# Patient Record
Sex: FEMALE | Race: BLACK OR AFRICAN AMERICAN | Employment: FULL TIME | URBAN - METROPOLITAN AREA
[De-identification: names, ages, dates, MRNs, and addresses within clinical notes are randomized per-mention and may not be internally consistent; named-entity substitution may affect disease eponyms.]

---

## 2017-06-22 ENCOUNTER — OP HISTORICAL/CONVERTED ENCOUNTER (OUTPATIENT)
Dept: OTHER | Age: 57
End: 2017-06-22

## 2018-04-03 ENCOUNTER — OP HISTORICAL/CONVERTED ENCOUNTER (OUTPATIENT)
Dept: OTHER | Age: 58
End: 2018-04-03

## 2018-04-09 ENCOUNTER — OP HISTORICAL/CONVERTED ENCOUNTER (OUTPATIENT)
Dept: OTHER | Age: 58
End: 2018-04-09

## 2018-04-17 ENCOUNTER — OP HISTORICAL/CONVERTED ENCOUNTER (OUTPATIENT)
Dept: OTHER | Age: 58
End: 2018-04-17

## 2018-05-01 ENCOUNTER — OFFICE VISIT (OUTPATIENT)
Dept: ONCOLOGY | Age: 58
End: 2018-05-01

## 2018-05-01 VITALS
HEIGHT: 67 IN | RESPIRATION RATE: 18 BRPM | WEIGHT: 232 LBS | HEART RATE: 99 BPM | OXYGEN SATURATION: 97 % | DIASTOLIC BLOOD PRESSURE: 90 MMHG | TEMPERATURE: 98 F | BODY MASS INDEX: 36.41 KG/M2 | SYSTOLIC BLOOD PRESSURE: 161 MMHG

## 2018-05-01 DIAGNOSIS — R59.1 LYMPHADENOPATHY: ICD-10-CM

## 2018-05-01 DIAGNOSIS — Z85.3 HISTORY OF BREAST CANCER: Primary | ICD-10-CM

## 2018-05-01 NOTE — PROGRESS NOTES
[employer]  [facility], [address] [place] [address]  Lisa Pabonvtarangre[address]  W: [phone]  F: [phone]     f/u HEME/ONC CONSULT      Reason for visit: management of   Breast Cancer    HPI:   Adal Eaton is a 66 y.o.  female who I was asked to see for a history of breast cancer and new LAD. She was originally diagnosed on 12/15/10 with a left breast biopsy showing IDC, gr 3, 0.4 cm, ER negative, WV negative, HER 2 negative (IHC 2 +, negative by FISH per report) ki67 90% . Originally clinical stage 3. Underwent neoadjuvant chemotherapy by Dr. Shital Haider, unclear regimen, may have been DD [facility] and paclitaxel. S/p with Dr. Charo Estes bilateral mastectomies on 8/10/11 showed benign tissue on the right, and on the left, no residual disease in the breast, but 1/10 LN involved with 0.8 cm of tumor, triple negative. S/p XRT with Dr. Dulce Saavedra on 10/25/11. [id number], stage IIA. BRCA 1/2 negative 1/4/11, MACKENZIE not performed. She was in [facility] ED for chest pain and SOB in 2/2018. Referred to a cardiologist, who ordered the Chest CT below --    4/3/18 CT chest shows right axillary and supraclavicular enlarged LN:  Small bilateral hilar LN, largest in right hilum being 1.5 cm; enlarged right ax LN, 2 cm; 1.6 cm right supraclav LN    4/17/18 PET:  Low grade activity in multiple small LNs throughout each side of neck    No complaints today. DX   Encounter Diagnoses   Name Primary?     History of breast cancer Yes    Lymphadenopathy               Past Medical History:   Diagnosis Date    Breast cancer ([address] 8/9/2011    Cancer ([address] 12/2010    left breast; LEFT ARM RESTRICTION FOR STICKS & BPs    Carcinoma of female breast ([address] 12/29/2010    Other ill-defined conditions(799.89)     Hiatal Hernia    Other ill-defined conditions(799.89)     Migraines    Other ill-defined conditions(799.89)     Heart Murmur (past)    Psychiatric disorder     ANXIETY IF DRIVING ON INTERSTATE    Unspecified adverse effect of anesthesia 2000    Lost Hair after colonoscopy  -  eyelids felt like \"chemical burn\"     Past Surgical History:   Procedure Laterality Date    HX BREAST BIOPSY      x 4  (in office)    HX BREAST RECONSTRUCTION  07/21012    HX BREAST RECONSTRUCTION  7/24/2013    BREAST RECONSTRUCTION performed by Pat Eldridge MD at [address] HX BREAST RECONSTRUCTION  4/18/2014    REVISION RIGHT RECONSTRUCTION BREAST performed by Pat Eldridge MD at [address] HX MASTECTOMY  2011    BILATERAL MASTECTOMY    HX ORTHOPAEDIC      Left Rotator Cuff Repair x 2     HX ORTHOPAEDIC      Manipulation of left shoulder x 2    HX OTHER SURGICAL      Colonoscopy    HX PELVIC LAPAROSCOPY      HX VASCULAR ACCESS      Portacath insertion/REMOVAL     Social History     Social History    Marital status: SINGLE     Spouse name: N/A    Number of children: N/A    Years of education: N/A     Social History Main Topics    Smoking status: Former Smoker     Packs/day: 0.25     Years: 9.00     Quit date: 6/6/1989    Smokeless tobacco: Never Used      Comment: SOCIAL SMOKING ONLY    Alcohol use Yes      Comment: SOCIALLY    Drug use: No    Sexual activity: No     Other Topics Concern    None     Social History Narrative     Family History   Problem Relation Age of Onset    Hypertension Mother     Hypertension Maternal Aunt     Hypertension Maternal Uncle     Hypertension Maternal Grandmother     Cancer Paternal Grandmother      BREAST    Hypertension Maternal Aunt     Hypertension Maternal Aunt     Hypertension Maternal Aunt     Hypertension Maternal Aunt     Hypertension Maternal Uncle     Hypertension Maternal Aunt     Alcohol abuse Father     Hypertension Brother     Anesth Problems Neg Hx        Current Outpatient Prescriptions   Medication Sig Dispense Refill    cholecalciferol (VITAMIN D3) 1,000 unit tablet Take  by mouth daily.       cyanocobalamin (VITAMIN B12) 500 mcg tablet Take 500 mcg by mouth daily.  zinc 50 mg Tab Take 50 mg by mouth daily.  aspirin 81 mg tablet Take 81 mg by mouth daily.  FOLIC ACID PO Take 444 mg by mouth daily. Allergies   Allergen Reactions    Other Food Other (comments)     SLIM RODERICK CAUSES VOMITING    Mushroom Combination No.1 Anaphylaxis     Pt allergic to mushrooms.  Adhesive Tape-Silicones Other (comments)     Skin sloughing    Other Medication Diarrhea and Nausea and Vomiting     All \"Cillins\"    Pcn [Penicillins] Diarrhea and Nausea and Vomiting    Sulfamethoxazole-Trimethoprim Diarrhea and Itching       Review of Systems    A comprehensive review of systems was performed and all systems were negative except for HPI and for the symptom review form, reviewed and scanned in.          Objective:  Physical Exam:  Visit Vitals    /90    Pulse 99    Temp 98 °F (36.7 °C) (Temporal)    Resp 18    Ht 5' 7\" (1.702 m)    Wt 232 lb (105.2 kg)    SpO2 97%    BMI 36.34 kg/m2       General:  Alert, cooperative, no distress, appears stated age. Head:  Normocephalic, without obvious abnormality, atraumatic. Eyes:  Conjunctivae/corneas clear. PERRL, EOMs intact. Throat: Lips, mucosa, and tongue normal.    Neck: Supple, symmetrical, trachea midline, no adenopathy, thyroid: no enlargement/tenderness/nodules   Back:   Symmetric, no curvature. ROM normal. No CVA tenderness. Lungs:   Clear to auscultation bilaterally. Chest wall:  No tenderness or deformity. Heart:  Regular rate and rhythm, S1, S2 normal, no murmur, click, rub or gallop. Abdomen:   Soft, non-tender. Bowel sounds normal. No masses,  No organomegaly. Extremities: Extremities normal, atraumatic, no cyanosis or edema. Skin: Skin color, texture, turgor normal. No rashes or lesions. Lymph nodes: Cervical, supraclavicular, nodes normal.  Am able to palpate 2 cm right ax LN   Neurologic: CNII-XII intact.        Diagnostic Imaging Results for orders placed in visit on 06/26/13   CTA ABDOMEN PELV W CONT       Lab Results  Lab Results   Component Value Date/Time    WBC 6.3 04/11/2014 03:50 PM    HGB 13.7 04/11/2014 03:50 PM    HCT 39.3 04/11/2014 03:50 PM    PLATELET 234 46/96/9158 03:50 PM    MCV 73.2 (L) 04/11/2014 03:50 PM       Lab Results   Component Value Date/Time    Sodium 141 04/11/2014 03:50 PM    Potassium 3.8 04/11/2014 03:50 PM    Chloride 105 04/11/2014 03:50 PM    CO2 29 04/11/2014 03:50 PM    Anion gap 7 04/11/2014 03:50 PM    Glucose 71 04/11/2014 03:50 PM    BUN 13 04/11/2014 03:50 PM    Creatinine 0.34 (L) 04/11/2014 03:50 PM    BUN/Creatinine ratio 38 (H) 04/11/2014 03:50 PM    GFR est AA >60 04/11/2014 03:50 PM    GFR est non-AA >60 04/11/2014 03:50 PM    Calcium 9.4 04/11/2014 03:50 PM    AST (SGOT) 14 06/26/2013 10:08 AM    Alk. phosphatase 93 06/26/2013 10:08 AM    Protein, total 6.8 06/26/2013 10:08 AM    Albumin 4.0 06/26/2013 10:08 AM    A-G Ratio 1.4 06/26/2013 10:08 AM    ALT (SGPT) 16 06/26/2013 10:08 AM       4/2/18 wbc 5.2; hgb 15.7, plt 201    Assessment/Plan:  66 y.o. female with L triple negative breast cancer. PS 0    1. Left Breast cancer stage: IIA    Hormonal therapy: not indicated due to receptor (-) status     Goal of therapy was cure. See #2    2. New LAD:  Will get US of right axilla and possible biopsy    3. Emotional well being:  She is coping well with her disease and has excellent support. All of the patient's questions were answered today. > 40 min were spent with this patient with > 50% of that time spent in face to face counseling. There are no Patient Instructions on file for this visit. Follow-up Disposition:  Return in about 8 days (around 5/9/2018).     Eliseo Borjas MD

## 2018-05-07 ENCOUNTER — HOSPITAL ENCOUNTER (OUTPATIENT)
Dept: MAMMOGRAPHY | Age: 58
Discharge: HOME | End: 2018-05-07
Attending: INTERNAL MEDICINE
Payer: COMMERCIAL

## 2018-05-07 DIAGNOSIS — Z85.3 HISTORY OF BREAST CANCER: ICD-10-CM

## 2018-05-07 DIAGNOSIS — R59.1 LYMPHADENOPATHY: ICD-10-CM

## 2018-05-07 PROCEDURE — 88342 IMHCHEM/IMCYTCHM 1ST ANTB: CPT | Performed by: RADIOLOGY

## 2018-05-07 PROCEDURE — 88305 TISSUE EXAM BY PATHOLOGIST: CPT | Performed by: RADIOLOGY

## 2018-05-07 PROCEDURE — 76882 US LMTD JT/FCL EVL NVASC XTR: CPT

## 2018-05-07 PROCEDURE — 88341 IMHCHEM/IMCYTCHM EA ADD ANTB: CPT | Performed by: RADIOLOGY

## 2018-05-07 PROCEDURE — 74011000250 HC RX REV CODE- 250: Performed by: RADIOLOGY

## 2018-05-07 PROCEDURE — A4648 IMPLANTABLE TISSUE MARKER: HCPCS

## 2018-05-07 RX ORDER — LIDOCAINE HYDROCHLORIDE AND EPINEPHRINE 10; 10 MG/ML; UG/ML
10 INJECTION, SOLUTION INFILTRATION; PERINEURAL ONCE
Status: COMPLETED | OUTPATIENT
Start: 2018-05-07 | End: 2018-05-07

## 2018-05-07 RX ORDER — LIDOCAINE HYDROCHLORIDE 10 MG/ML
5 INJECTION INFILTRATION; PERINEURAL
Status: COMPLETED | OUTPATIENT
Start: 2018-05-07 | End: 2018-05-07

## 2018-05-07 RX ADMIN — LIDOCAINE HYDROCHLORIDE 5 ML: 10 INJECTION, SOLUTION INFILTRATION; PERINEURAL at 10:00

## 2018-05-07 RX ADMIN — LIDOCAINE HYDROCHLORIDE AND EPINEPHRINE 100 MG: 10; 10 INJECTION, SOLUTION INFILTRATION; PERINEURAL at 10:00

## 2018-05-07 NOTE — PROGRESS NOTES
5/7/2018      RE: Roney Ragsdale      To Whom it May Concern: This is to certify that Roney Ragsdale may may return to work with the following restrictions:    ACTIVITY     Do not participate in any strenuous activities for 24 - 48 hours (exercise, sports, housework, etc.).  You may resume work (light duty only) for the first 48 hours.  No heavy lifting with the arm on the affected side of your body for 24-48. Please feel free to contact my office if you have any questions or concerns. Thank you for your assistance in this matter.     Sincerely,      Leticia Lloyd RN

## 2018-05-07 NOTE — PROGRESS NOTES
Patient tolerated right axilla biopsy well with minimal bleeding. Biopsy site was dressed with one steri strip, a gauze and wide paper tape. Discharge instructions were reviewed with the patient and she was provided written copy as well as a note outlining activity restrictions to be given to her employer. Advised patient that pathology results should be available by Wednesday May 9 in the afternoon and that she would receive a phone call.

## 2018-05-07 NOTE — DISCHARGE INSTRUCTIONS
Breast Biopsy Discharge Instructions    PAIN CONTROL     You may have mild discomfort; take 1-2 Tylenol every 4-6 hours as needed.  Do not take aspirin containing products or anti-inflammatory medications (Advil, Aleve, Motrin, Ibuprofen, etc.) for 24 hours.  Wearing a comfortable bra for support may help with discomfort. WOUND CARE      A small amount of bleeding or bruising at the biopsy site is normal. Watch for signs and symptoms of infections: skin warm to touch, yellowish drainage from wound, fever or severe pain.  Place an ice pack over the site hourly, 20-30 at a time for a few hours today.  You may remove the band-aid in 24 hours. The steri-strip (small piece of tape covering the incision) will fall off on its own in a few days. Please try not to get the site wet for about 24 hours.  If at any point you have EXCESSIVE BLEEDING (saturating the gauze under the band-aid) OR pain please call:    Daytime 7:30am-5:00pm: [facility] [phone]    After Hours:    [phone] (ask to speak to a radiologist)              or [address] [place] [address] [phone]    ACTIVITY     Do not participate in any strenuous activities for 24 - 48 hours (exercise, sports, housework, etc.).  You may resume work (light duty only) for the first 48 hours.  No heavy lifting with the arm on the affected side of your body for 24-48. ADDITIONAL INSTRUCTIONS    We will call you with results on Wednesday May 9, in the afternoon.       YOUR TREATMENT TEAM TODAY WAS    MD: Nick Amador  RN: Victoriano Carrero  Radiology Tech: [place]

## 2018-05-09 ENCOUNTER — OFFICE VISIT (OUTPATIENT)
Dept: ONCOLOGY | Age: 58
End: 2018-05-09

## 2018-05-09 VITALS
TEMPERATURE: 97.9 F | BODY MASS INDEX: 36.1 KG/M2 | RESPIRATION RATE: 18 BRPM | HEART RATE: 90 BPM | WEIGHT: 230 LBS | SYSTOLIC BLOOD PRESSURE: 134 MMHG | DIASTOLIC BLOOD PRESSURE: 81 MMHG | HEIGHT: 67 IN

## 2018-05-09 DIAGNOSIS — R59.1 LYMPHADENOPATHY: ICD-10-CM

## 2018-05-09 DIAGNOSIS — C50.919 BREAST CANCER, STAGE 2, UNSPECIFIED LATERALITY (HCC): ICD-10-CM

## 2018-05-09 DIAGNOSIS — C50.919 MALIGNANT NEOPLASM OF BREAST, STAGE 2, UNSPECIFIED LATERALITY (HCC): Primary | ICD-10-CM

## 2018-05-09 NOTE — PROGRESS NOTES
[address]  [address] [place] [address], [address] [place] [address]  Lisa Pabonvremy [address]  W: [phone]  F: [phone]     f/u HEME/ONC CONSULT      Reason for visit: management of   Breast Cancer    HPI:   Aida Graham is a 66 y.o.  female who I was asked to see for a history of breast cancer and new LAD. She was originally diagnosed on 12/15/10 with a left breast biopsy showing IDC, gr 3, 0.4 cm, ER negative, AK negative, HER 2 negative (IHC 2 +, negative by FISH per report) ki67 90% . Originally clinical stage 3. Underwent neoadjuvant chemotherapy by Dr. Popeye Feng, unclear regimen, may have been DD [facility] and paclitaxel. S/p with Dr. Hans Dobson bilateral mastectomies on 8/10/11 showed benign tissue on the right, and on the left, no residual disease in the breast, but 1/10 LN involved with 0.8 cm of tumor, triple negative. S/p XRT with Dr. Lonny Love on 10/25/11. [id number], stage IIA. BRCA 1/2 negative 1/4/11, MACKENZIE not performed. She was in [facility] ED for chest pain and SOB in 2/2018. Referred to a cardiologist, who ordered the Chest CT below --    4/3/18 CT chest shows right axillary and supraclavicular enlarged LN:  Small bilateral hilar LN, largest in right hilum being 1.5 cm; enlarged right ax LN, 2 cm; 1.6 cm right supraclav LN    4/17/18 PET:  Low grade activity in multiple small LNs throughout each side of neck    Interval history: In today for follow up. Complains of gr 1 cough. DX   Encounter Diagnoses   Name Primary?     Malignant neoplasm of breast, stage 2, unspecified laterality ([address] Yes    Breast cancer, stage 2, unspecified laterality ([address]     Lymphadenopathy               Past Medical History:   Diagnosis Date    Breast cancer ([address] 8/9/2011    Cancer ([address] 12/2010    left breast; LEFT ARM RESTRICTION FOR STICKS & BPs    Carcinoma of female breast ([address] 12/29/2010    Other ill-defined conditions(799.89)     Hiatal Hernia    Other ill-defined conditions(799.89) Migraines    Other ill-defined conditions(799.89)     Heart Murmur (past)    Psychiatric disorder     ANXIETY IF DRIVING ON INTERSTATE    Unspecified adverse effect of anesthesia 2000    Lost Hair after colonoscopy  -  eyelids felt like \"chemical burn\"     Past Surgical History:   Procedure Laterality Date    HX BREAST BIOPSY      x 4  (in office)    HX BREAST RECONSTRUCTION  07/21012    HX BREAST RECONSTRUCTION  7/24/2013    BREAST RECONSTRUCTION performed by Max Bolivar MD at [address] HX BREAST RECONSTRUCTION  4/18/2014    REVISION RIGHT RECONSTRUCTION BREAST performed by Max Bolivar MD at [address] HX MASTECTOMY  2011    BILATERAL MASTECTOMY    HX ORTHOPAEDIC      Left Rotator Cuff Repair x 2     HX ORTHOPAEDIC      Manipulation of left shoulder x 2    HX OTHER SURGICAL      Colonoscopy    HX PELVIC LAPAROSCOPY      HX VASCULAR ACCESS      Portacath insertion/REMOVAL     Social History     Social History    Marital status: SINGLE     Spouse name: N/A    Number of children: N/A    Years of education: N/A     Social History Main Topics    Smoking status: Former Smoker     Packs/day: 0.25     Years: 9.00     Quit date: 6/6/1989    Smokeless tobacco: Never Used      Comment: SOCIAL SMOKING ONLY    Alcohol use Yes      Comment: SOCIALLY    Drug use: No    Sexual activity: No     Other Topics Concern    None     Social History Narrative     Family History   Problem Relation Age of Onset    Hypertension Mother     Alcohol abuse Father     Hypertension Maternal Aunt     Hypertension Maternal Uncle     Hypertension Maternal Grandmother     Cancer Paternal Grandmother      BREAST    Breast Cancer Paternal Grandmother     Hypertension Maternal Aunt     Hypertension Maternal Aunt     Hypertension Maternal Aunt     Hypertension Maternal Aunt     Hypertension Maternal Uncle     Hypertension Maternal Aunt     Hypertension Brother     Anesth Problems Neg Hx Current Outpatient Prescriptions   Medication Sig Dispense Refill    cholecalciferol (VITAMIN D3) 1,000 unit tablet Take  by mouth daily.  cyanocobalamin (VITAMIN B12) 500 mcg tablet Take 500 mcg by mouth daily.  zinc 50 mg Tab Take 50 mg by mouth daily.  aspirin 81 mg tablet Take 81 mg by mouth daily.  FOLIC ACID PO Take 738 mg by mouth daily. Allergies   Allergen Reactions    Other Food Other (comments)     SLIM RODERICK CAUSES VOMITING    Mushroom Combination No.1 Anaphylaxis     Pt allergic to mushrooms.  Adhesive Tape-Silicones Other (comments)     Skin sloughing    Other Medication Diarrhea and Nausea and Vomiting     All \"Cillins\"    Pcn [Penicillins] Diarrhea and Nausea and Vomiting    Sulfamethoxazole-Trimethoprim Diarrhea and Itching       Review of Systems    A comprehensive review of systems was performed and all systems were negative except for HPI and for the symptom review form, reviewed and scanned in.          Objective:  Physical Exam:  Visit Vitals    /81    Pulse 90    Temp 97.9 °F (36.6 °C) (Temporal)    Resp 18    Ht 5' 7\" (1.702 m)    Wt 230 lb (104.3 kg)    BMI 36.02 kg/m2       General:  Alert, cooperative, no distress, appears stated age. Head:  Normocephalic, without obvious abnormality, atraumatic. Eyes:  Conjunctivae/corneas clear. PERRL, EOMs intact. Throat: Lips, mucosa, and tongue normal.    Neck: Supple, symmetrical, trachea midline, no adenopathy, thyroid: no enlargement/tenderness/nodules   Back:   Symmetric, no curvature. ROM normal. No CVA tenderness. Lungs:   Clear to auscultation bilaterally. Chest wall:  No tenderness or deformity. Heart:  Regular rate and rhythm, S1, S2 normal, no murmur, click, rub or gallop. Abdomen:   Soft, non-tender. Bowel sounds normal. No masses,  No organomegaly. Extremities: Extremities normal, atraumatic, no cyanosis or edema.    Skin: Skin color, texture, turgor normal. No rashes or lesions. Lymph nodes: Cervical, supraclavicular, nodes normal.  Am able to palpate 2 cm right ax LN   Neurologic: CNII-XII intact. Diagnostic Imaging   4/3/18 CT chest shows right axillary and supraclavicular enlarged LN:  Small bilateral hilar LN, largest in right hilum being 1.5 cm; enlarged right ax LN, 2 cm; 1.6 cm right supraclav LN    4/17/18 PET:  Low grade activity in multiple small LNs throughout each side of neck    5/7/18 US Right axilla  IMPRESSION:  1. BI-RADS Assessment Category 4a: Suspicious abnormality - Biopsy should be  performed in the absence of clinical contraindication.     Right axillary lymph nodes are confirmed, the largest measuring 1.9 cm. After  discussion with Dr. Olvin Pope, it was decided to proceed with a right axillary lymph  node biopsy. This will be performed later today and reported separately.     The patient has been notified of these results and recommendations. 5/7/18 Right LN biopsy: negative. Results for orders placed in visit on 06/26/13   CTA ABDOMEN PELV W CONT       Lab Results  Lab Results   Component Value Date/Time    WBC 6.3 04/11/2014 03:50 PM    HGB 13.7 04/11/2014 03:50 PM    HCT 39.3 04/11/2014 03:50 PM    PLATELET 212 29/77/9451 03:50 PM    MCV 73.2 (L) 04/11/2014 03:50 PM       Lab Results   Component Value Date/Time    Sodium 141 04/11/2014 03:50 PM    Potassium 3.8 04/11/2014 03:50 PM    Chloride 105 04/11/2014 03:50 PM    CO2 29 04/11/2014 03:50 PM    Anion gap 7 04/11/2014 03:50 PM    Glucose 71 04/11/2014 03:50 PM    BUN 13 04/11/2014 03:50 PM    Creatinine 0.34 (L) 04/11/2014 03:50 PM    BUN/Creatinine ratio 38 (H) 04/11/2014 03:50 PM    GFR est AA >60 04/11/2014 03:50 PM    GFR est non-AA >60 04/11/2014 03:50 PM    Calcium 9.4 04/11/2014 03:50 PM    AST (SGOT) 14 06/26/2013 10:08 AM    Alk.  phosphatase 93 06/26/2013 10:08 AM    Protein, total 6.8 06/26/2013 10:08 AM    Albumin 4.0 06/26/2013 10:08 AM    A-G Ratio 1.4 06/26/2013 10:08 AM    ALT (SGPT) 16 06/26/2013 10:08 AM       4/2/18 wbc 5.2; hgb 15.7, plt 201    Assessment/Plan:  66 y.o. female with L triple negative breast cancer. PS 0    1. Left Breast cancer stage: IIA    Hormonal therapy: not indicated due to receptor (-) status     Goal of therapy was cure. See #2    2. New LAD:  US of right axilla and biospy on 5/7/18 normal.    3. Emotional well being:  She is coping well with her disease and has excellent support. All of the patient's questions were answered today. > 15 min were spent with this patient with > 50% of that time spent in face to face counseling. There are no Patient Instructions on file for this visit. Follow-up Disposition:  Return in about 1 year (around 5/9/2019) for 1 year follow shirlene Sanabria MD

## 2018-05-09 NOTE — PROGRESS NOTES
Pathology approved by Dr. Sujata Mejia. Called patient and advised her that the lymph node biopsy was benign. Patient states that she is on her way to Dr. Betty Rodríguez office now. She states that she has not removed the bandage, but that she has had no concerns with the biopsy site. Advised the patient that she may take the bandage off at this point. Encouraged her to call with any questions or concerns.

## 2019-06-19 ENCOUNTER — OFFICE VISIT (OUTPATIENT)
Dept: ONCOLOGY | Age: 59
End: 2019-06-19

## 2019-06-19 VITALS
DIASTOLIC BLOOD PRESSURE: 79 MMHG | WEIGHT: 233 LBS | HEIGHT: 67 IN | BODY MASS INDEX: 36.57 KG/M2 | SYSTOLIC BLOOD PRESSURE: 141 MMHG | RESPIRATION RATE: 18 BRPM | OXYGEN SATURATION: 98 % | HEART RATE: 82 BPM | TEMPERATURE: 97 F

## 2019-06-19 DIAGNOSIS — C50.919 MALIGNANT NEOPLASM OF BREAST, STAGE 2, UNSPECIFIED LATERALITY (HCC): Primary | ICD-10-CM

## 2019-06-19 DIAGNOSIS — R59.1 LYMPHADENOPATHY: ICD-10-CM

## 2019-06-19 RX ORDER — LOSARTAN POTASSIUM 25 MG/1
50 TABLET ORAL
Refills: 11 | COMMUNITY
Start: 2019-05-23

## 2019-06-19 NOTE — PROGRESS NOTES
[employer]  [address] [facility] [address]  W: [phone]  F: [phone]     f/u HEME/ONC CONSULT      Reason for visit: management of   Breast Cancer    HPI:   Aida Daley is a 64 y.o.  female who I was asked to see for a history of breast cancer and new LAD. She was originally diagnosed on 12/15/10 with a left breast biopsy showing IDC, gr 3, 0.4 cm, ER negative, IN negative, HER 2 negative (IHC 2 +, negative by FISH per report) ki67 90% . Originally clinical stage 3. Underwent neoadjuvant chemotherapy by Dr. Sherita Scheuermann, unclear regimen, may have been DD [facility] and paclitaxel. S/p with Dr. Salud Holley bilateral mastectomies on 8/10/11 showed benign tissue on the right, and on the left, no residual disease in the breast, but 1/10 LN involved with 0.8 cm of tumor, triple negative. S/p XRT with Dr. Bill Ontiveros on 10/25/11. [id number], stage IIA. BRCA 1/2 negative 1/4/11, MACKENZIE not performed. She was in [facility] ED for chest pain and SOB in 2/2018. Referred to a cardiologist, who ordered the Chest CT below --    4/3/18 CT chest shows right axillary and supraclavicular enlarged LN:  Small bilateral hilar LN, largest in right hilum being 1.5 cm; enlarged right ax LN, 2 cm; 1.6 cm right supraclav LN    4/17/18 PET:  Low grade activity in multiple small LNs throughout each side of neck    Interval history: In today for follow up. No complaints today. Recently had surgery to her left foot. DX   Encounter Diagnoses   Name Primary?     Malignant neoplasm of breast, stage 2, unspecified laterality ([address] Yes    Lymphadenopathy               Past Medical History:   Diagnosis Date    Breast cancer ([address] 8/9/2011    Cancer ([address] 12/2010    left breast; LEFT ARM RESTRICTION FOR STICKS & BPs    Carcinoma of female breast ([address] 12/29/2010    Other ill-defined conditions(799.89)     Hiatal Hernia    Other ill-defined conditions(799.89)     Migraines    Other ill-defined conditions(799.89)     Heart Murmur (past)    Psychiatric disorder     ANXIETY IF DRIVING ON INTERSTATE    Unspecified adverse effect of anesthesia     Lost Hair after colonoscopy  -  eyelids felt like \"chemical burn\"     Past Surgical History:   Procedure Laterality Date    HX BREAST BIOPSY      x 4  (in office)    HX BREAST RECONSTRUCTION      HX BREAST RECONSTRUCTION  2013    BREAST RECONSTRUCTION performed by Blanca Hirsch MD at [address] HX BREAST RECONSTRUCTION  2014    REVISION RIGHT RECONSTRUCTION BREAST performed by Blanca Hirsch MD at [address] HX MASTECTOMY  2011    BILATERAL MASTECTOMY    HX ORTHOPAEDIC      Left Rotator Cuff Repair x 2     HX ORTHOPAEDIC      Manipulation of left shoulder x 2    HX OTHER SURGICAL      Colonoscopy    HX PELVIC LAPAROSCOPY      HX VASCULAR ACCESS      Portacath insertion/REMOVAL     Social History     Socioeconomic History    Marital status: SINGLE     Spouse name: Not on file    Number of children: Not on file    Years of education: Not on file    Highest education level: Not on file   Tobacco Use    Smoking status: Former Smoker     Packs/day: 0.25     Years: 9.00     Pack years: 2.25     Last attempt to quit: 1989     Years since quittin.0    Smokeless tobacco: Never Used    Tobacco comment: SOCIAL SMOKING ONLY   Substance and Sexual Activity    Alcohol use: Yes     Comment: SOCIALLY    Drug use: No    Sexual activity: Never     Family History   Problem Relation Age of Onset    Hypertension Mother     Alcohol abuse Father     Hypertension Maternal Aunt     Hypertension Maternal Uncle     Hypertension Maternal Grandmother     Cancer Paternal Grandmother         BREAST    Breast Cancer Paternal Grandmother     Hypertension Maternal Aunt     Hypertension Maternal Aunt     Hypertension Maternal Aunt     Hypertension Maternal Aunt     Hypertension Maternal Uncle     Hypertension Maternal Aunt     Hypertension Brother     Anesth Problems Neg Hx        Current Outpatient Medications   Medication Sig Dispense Refill    losartan (COZAAR) 25 mg tablet TAKE 1 TABLET BY MOUTH EVERY DAY  11    cholecalciferol (VITAMIN D3) 1,000 unit tablet Take  by mouth daily.  cyanocobalamin (VITAMIN B12) 500 mcg tablet Take 500 mcg by mouth daily.  zinc 50 mg Tab Take 50 mg by mouth daily.  aspirin 81 mg tablet Take 81 mg by mouth daily.  FOLIC ACID PO Take 309 mg by mouth daily. Allergies   Allergen Reactions    Other Food Other (comments)     SLIM RODERICK CAUSES VOMITING    Mushroom Combination No.1 Anaphylaxis     Pt allergic to mushrooms.  Adhesive Tape-Silicones Other (comments)     Skin sloughing    Lidocaine Pain Relief [Lidocaine] Other (comments)    Other Medication Diarrhea and Nausea and Vomiting     All \"Cillins\"    Pcn [Penicillins] Diarrhea and Nausea and Vomiting    Sulfamethoxazole-Trimethoprim Diarrhea and Itching       Review of Systems    A comprehensive review of systems was performed and all systems were negative except for HPI and for the symptom review form, reviewed and scanned in.          Objective:  Physical Exam:  Visit Vitals  /79   Pulse 82   Temp 97 °F (36.1 °C) (Temporal)   Resp 18   Ht 5' 7\" (1.702 m)   Wt 233 lb (105.7 kg)   SpO2 98%   BMI 36.49 kg/m²       General:  Alert, cooperative, no distress, appears stated age. Head:  Normocephalic, without obvious abnormality, atraumatic. Eyes:  Conjunctivae/corneas clear. PERRL, EOMs intact. Throat: Lips, mucosa, and tongue normal.    Neck: Supple, symmetrical, trachea midline, no adenopathy, thyroid: no enlargement/tenderness/nodules   Back:   Symmetric, no curvature. ROM normal. No CVA tenderness. Lungs:   Clear to auscultation bilaterally. Chest wall:  No tenderness or deformity. Heart:  Regular rate and rhythm, S1, S2 normal, no murmur, click, rub or gallop. Abdomen:   Soft, non-tender. Bowel sounds normal. No masses,  No organomegaly. Extremities: L foot in boot   Skin: Skin color, texture, turgor normal. No rashes or lesions. Lymph nodes: Cervical, supraclavicular, nodes normal.  Unable to palpate right ax LN   Neurologic: CNII-XII intact. Diagnostic Imaging   4/3/18 CT chest shows right axillary and supraclavicular enlarged LN:  Small bilateral hilar LN, largest in right hilum being 1.5 cm; enlarged right ax LN, 2 cm; 1.6 cm right supraclav LN    4/17/18 PET:  Low grade activity in multiple small LNs throughout each side of neck    5/7/18 US Right axilla  IMPRESSION:  1. BI-RADS Assessment Category 4a: Suspicious abnormality - Biopsy should be  performed in the absence of clinical contraindication.     Right axillary lymph nodes are confirmed, the largest measuring 1.9 cm. After  discussion with Dr. Maddie Amaya, it was decided to proceed with a right axillary lymph  node biopsy. This will be performed later today and reported separately.     The patient has been notified of these results and recommendations. 5/7/18 Right LN biopsy: negative.      Results for orders placed in visit on 06/26/13   CTA ABDOMEN PELV W CONT       Lab Results  Lab Results   Component Value Date/Time    WBC 6.3 04/11/2014 03:50 PM    HGB 13.7 04/11/2014 03:50 PM    HCT 39.3 04/11/2014 03:50 PM    PLATELET 936 91/87/4661 03:50 PM    MCV 73.2 (L) 04/11/2014 03:50 PM       Lab Results   Component Value Date/Time    Sodium 141 04/11/2014 03:50 PM    Potassium 3.8 04/11/2014 03:50 PM    Chloride 105 04/11/2014 03:50 PM    CO2 29 04/11/2014 03:50 PM    Anion gap 7 04/11/2014 03:50 PM    Glucose 71 04/11/2014 03:50 PM    BUN 13 04/11/2014 03:50 PM    Creatinine 0.34 (L) 04/11/2014 03:50 PM    BUN/Creatinine ratio 38 (H) 04/11/2014 03:50 PM    GFR est AA >60 04/11/2014 03:50 PM    GFR est non-AA >60 04/11/2014 03:50 PM    Calcium 9.4 04/11/2014 03:50 PM    AST (SGOT) 14 06/26/2013 10:08 AM Alk. phosphatase 93 06/26/2013 10:08 AM    Protein, total 6.8 06/26/2013 10:08 AM    Albumin 4.0 06/26/2013 10:08 AM    A-G Ratio 1.4 06/26/2013 10:08 AM    ALT (SGPT) 16 06/26/2013 10:08 AM       4/2/18 wbc 5.2; hgb 15.7, plt 201    Assessment/Plan:  64 y.o. female with L triple negative breast cancer. PS 0    1. Left Breast cancer stage: IIA    Hormonal therapy: not indicated due to receptor (-) status     Goal of therapy was cure. See #2    2. New LAD:  US of right axilla and biospy on 5/7/18 normal.    3. Emotional well being:  She is coping well with her disease and has excellent support. 4.  Htn:  Just started losartan and following with pcp    All of the patient's questions were answered today. > 15 min were spent with this patient with > 50% of that time spent in face to face counseling. This patient was seen in conjunction with Kailyn Jaffe NP            There are no Patient Instructions on file for this visit. Follow-up and Dispositions    · Return in about 1 year (around 6/19/2020) for 1 yr fu, Maudie Gosselin.          Roxy Mishra MD

## 2020-06-16 ENCOUNTER — TELEPHONE (OUTPATIENT)
Dept: ONCOLOGY | Age: 60
End: 2020-06-16

## 2020-06-22 ENCOUNTER — VIRTUAL VISIT (OUTPATIENT)
Dept: ONCOLOGY | Age: 60
End: 2020-06-22

## 2020-06-22 DIAGNOSIS — Z85.3 HISTORY OF BREAST CANCER: Primary | ICD-10-CM

## 2020-06-22 NOTE — PROGRESS NOTES
[address]  [address] [facility], [address] [place] [address]  [place]  W: [phone]  F: [phone]     f/u HEME/ONC CONSULT        Reason for Visit:   Willy Ball is a 64 y.o. female who is seen by synchronous (real-time) audio-video technology for follow up of breast cancer    HPI:   Willy Ball is a 64 y.o.  female who I was asked to see for a history of breast cancer and new LAD. She was originally diagnosed on 12/15/10 with a left breast biopsy showing IDC, gr 3, 0.4 cm, ER negative, TX negative, HER 2 negative (IHC 2 +, negative by FISH per report) ki67 90% . Originally clinical stage 3. Underwent neoadjuvant chemotherapy by Dr. Marcelo Allison, unclear regimen, may have been DD [facility] and paclitaxel. S/p with Dr. Kaylee Kayser bilateral mastectomies on 8/10/11 showed benign tissue on the right, and on the left, no residual disease in the breast, but 1/10 LN involved with 0.8 cm of tumor, triple negative. S/p XRT with Dr. Ken Morillo on 10/25/11. [id number], stage IIA. BRCA 1/2 negative 1/4/11, MACKENZIE not performed. She was in [facility] ED for chest pain and SOB in 2/2018. Referred to a cardiologist, who ordered the Chest CT below --    4/3/18 CT chest shows right axillary and supraclavicular enlarged LN:  Small bilateral hilar LN, largest in right hilum being 1.5 cm; enlarged right ax LN, 2 cm; 1.6 cm right supraclav LN    4/17/18 PET:  Low grade activity in multiple small LNs throughout each side of neck    Interval history: In today for follow up. Thinks she had covid19 in Dec 2019        DX   Encounter Diagnosis   Name Primary?     History of breast cancer Yes              Past Medical History:   Diagnosis Date    Breast cancer ([address] 8/9/2011    Cancer ([address] 12/2010    left breast; LEFT ARM RESTRICTION FOR STICKS & BPs    Carcinoma of female breast ([address] 12/29/2010    Other ill-defined conditions(799.89)     Hiatal Hernia    Other ill-defined conditions(799.89)     Migraines  Other ill-defined conditions(799.89)     Heart Murmur (past)    Psychiatric disorder     ANXIETY IF DRIVING ON INTERSTATE    Unspecified adverse effect of anesthesia     Lost Hair after colonoscopy  -  eyelids felt like \"chemical burn\"     Past Surgical History:   Procedure Laterality Date    HX BREAST BIOPSY      x 4  (in office)    HX BREAST RECONSTRUCTION      HX BREAST RECONSTRUCTION  2013    BREAST RECONSTRUCTION performed by Josr Morales MD at [address] HX BREAST RECONSTRUCTION  2014    REVISION RIGHT RECONSTRUCTION BREAST performed by Josr Morales MD at [address] HX MASTECTOMY  2011    BILATERAL MASTECTOMY    HX ORTHOPAEDIC      Left Rotator Cuff Repair x 2     HX ORTHOPAEDIC      Manipulation of left shoulder x 2    HX OTHER SURGICAL      Colonoscopy    HX PELVIC LAPAROSCOPY      HX VASCULAR ACCESS      Portacath insertion/REMOVAL     Social History     Socioeconomic History    Marital status: SINGLE     Spouse name: Not on file    Number of children: Not on file    Years of education: Not on file    Highest education level: Not on file   Tobacco Use    Smoking status: Former Smoker     Packs/day: 0.25     Years: 9.00     Pack years: 2.25     Last attempt to quit: 1989     Years since quittin.0    Smokeless tobacco: Never Used    Tobacco comment: SOCIAL SMOKING ONLY   Substance and Sexual Activity    Alcohol use: Yes     Comment: SOCIALLY    Drug use: No    Sexual activity: Never     Family History   Problem Relation Age of Onset    Hypertension Mother     Alcohol abuse Father     Hypertension Maternal Aunt     Hypertension Maternal Uncle     Hypertension Maternal Grandmother     Cancer Paternal Grandmother         BREAST    Breast Cancer Paternal Grandmother     Hypertension Maternal Aunt     Hypertension Maternal Aunt     Hypertension Maternal Aunt     Hypertension Maternal Aunt     Hypertension Maternal Uncle  Hypertension Maternal Aunt     Hypertension Brother     Anesth Problems Neg Hx        Current Outpatient Medications   Medication Sig Dispense Refill    losartan (COZAAR) 25 mg tablet TAKE 1 TABLET BY MOUTH EVERY DAY  11    cholecalciferol (VITAMIN D3) 1,000 unit tablet Take  by mouth daily.  cyanocobalamin (VITAMIN B12) 500 mcg tablet Take 500 mcg by mouth daily.  zinc 50 mg Tab Take 50 mg by mouth daily.  aspirin 81 mg tablet Take 81 mg by mouth daily.  FOLIC ACID PO Take 393 mg by mouth daily. Allergies   Allergen Reactions    Other Food Other (comments)     SLIM RODERICK CAUSES VOMITING    Mushroom Combination No.1 Anaphylaxis     Pt allergic to mushrooms.  Adhesive Tape-Silicones Other (comments)     Skin sloughing    Lidocaine Pain Relief [Lidocaine] Other (comments)    Other Medication Diarrhea and Nausea and Vomiting     All \"Cillins\"    Pcn [Penicillins] Diarrhea and Nausea and Vomiting    Sulfamethoxazole-Trimethoprim Diarrhea and Itching       Review of Systems    A comprehensive review of systems was performed and all systems were negative except for HPI         Objective:  Physical Exam:  There were no vitals taken for this visit. General: alert, cooperative, no distress   Mental  status: normal mood, behavior, speech, dress, motor activity, and thought processes, able to follow commands   HENT: NCAT   Neck: no visualized mass   Resp: no respiratory distress   Neuro: no gross deficits   Skin: no discoloration or lesions of concern on visible areas   Psychiatric: normal affect, consistent with stated mood, no evidence of hallucinations       Due to this being a TeleHealth evaluation (During YBSSR-42 public health emergency), many elements of the physical examination are unable to be assessed. Evaluation of the following organ systems was limited: Vitals/Constitutional/EENT/Resp/CV/GI//MS/Neuro/Skin/Heme-Lymph-Imm. Diagnostic Imaging   4/3/18 CT chest shows right axillary and supraclavicular enlarged LN:  Small bilateral hilar LN, largest in right hilum being 1.5 cm; enlarged right ax LN, 2 cm; 1.6 cm right supraclav LN    4/17/18 PET:  Low grade activity in multiple small LNs throughout each side of neck    5/7/18 US Right axilla  IMPRESSION:  1. BI-RADS Assessment Category 4a: Suspicious abnormality - Biopsy should be  performed in the absence of clinical contraindication.     Right axillary lymph nodes are confirmed, the largest measuring 1.9 cm. After  discussion with Dr. Husam France, it was decided to proceed with a right axillary lymph  node biopsy. This will be performed later today and reported separately.     The patient has been notified of these results and recommendations. 5/7/18 Right LN biopsy: negative. Results for orders placed in visit on 06/26/13   CTA ABDOMEN PELV W CONT       Lab Results  Lab Results   Component Value Date/Time    WBC 6.3 04/11/2014 03:50 PM    HGB 13.7 04/11/2014 03:50 PM    HCT 39.3 04/11/2014 03:50 PM    PLATELET 720 27/26/3662 03:50 PM    MCV 73.2 (L) 04/11/2014 03:50 PM       Lab Results   Component Value Date/Time    Sodium 141 04/11/2014 03:50 PM    Potassium 3.8 04/11/2014 03:50 PM    Chloride 105 04/11/2014 03:50 PM    CO2 29 04/11/2014 03:50 PM    Anion gap 7 04/11/2014 03:50 PM    Glucose 71 04/11/2014 03:50 PM    BUN 13 04/11/2014 03:50 PM    Creatinine 0.34 (L) 04/11/2014 03:50 PM    BUN/Creatinine ratio 38 (H) 04/11/2014 03:50 PM    GFR est AA >60 04/11/2014 03:50 PM    GFR est non-AA >60 04/11/2014 03:50 PM    Calcium 9.4 04/11/2014 03:50 PM    Alk. phosphatase 93 06/26/2013 10:08 AM    Protein, total 6.8 06/26/2013 10:08 AM    Albumin 4.0 06/26/2013 10:08 AM    A-G Ratio 1.4 06/26/2013 10:08 AM    ALT (SGPT) 16 06/26/2013 10:08 AM       4/2/18 wbc 5.2; hgb 15.7, plt 201    Assessment/Plan:  64 y.o. female with L triple negative breast cancer.   PS 0    1. Left Breast cancer stage: IIA    Hormonal therapy: not indicated due to receptor (-) status     Goal of therapy was cure. See #2    2. LAD:  US of right axilla and biospy on 5/7/18 normal. Resolved; will obtain CT chest wo contrast to follow up    3. Emotional well being:  She is coping well with her disease and has excellent support. 4.  Htn:  on losartan and following with pcp    All of the patient's questions were answered today. I was in the office while conducting this encounter. The patient was at her home. Consent:  She and/or her healthcare decision maker is aware that this patient-initiated Telehealth encounter is a billable service, with coverage as determined by her insurance carrier. She is aware that she may receive a bill and has provided verbal consent to proceed: Yes    Pursuant to the emergency declaration under the [address] and the [facility] [address] waiver authority and the [employer] and [employer] General Act, this Virtual  Visit was conducted, with patient's (and/or legal guardian's) consent, to reduce the patient's risk of exposure to COVID-19 and provide necessary medical care. Services were provided through a video synchronous discussion virtually to substitute for in-person visit. There are no Patient Instructions on file for this visit.        Christian Henning MD

## 2020-07-08 ENCOUNTER — TELEPHONE (OUTPATIENT)
Dept: ONCOLOGY | Age: 60
End: 2020-07-08

## 2020-07-08 ENCOUNTER — HOSPITAL ENCOUNTER (OUTPATIENT)
Dept: CT IMAGING | Age: 60
Discharge: HOME | End: 2020-07-08
Attending: INTERNAL MEDICINE
Payer: COMMERCIAL

## 2020-07-08 DIAGNOSIS — Z85.3 HISTORY OF BREAST CANCER: ICD-10-CM

## 2020-07-08 PROCEDURE — 71250 CT THORAX DX C-: CPT

## 2020-07-08 NOTE — TELEPHONE ENCOUNTER
p2p completed. Returned call to [facility] and approval # given.       Approval # [id number]  Ex 8/22/2020

## 2020-07-08 NOTE — TELEPHONE ENCOUNTER
Craig Walter is calling to give us Peer to Peer review info   CT of the Chest requires the Peer to Peer    Patient is scheduled for this CT Today at 3:30 --they were just Notified by [facility] [employer] [email or web address] [facility].    The number to call---[phone] choose Option 3     Case number ---[id number]    Thanks

## 2020-08-20 LAB — PAP SMEAR, EXTERNAL: NEGATIVE

## 2020-08-31 ENCOUNTER — ED HISTORICAL/CONVERTED ENCOUNTER (OUTPATIENT)
Dept: OTHER | Age: 60
End: 2020-08-31

## 2021-08-30 ENCOUNTER — TELEPHONE (OUTPATIENT)
Dept: ONCOLOGY | Age: 61
End: 2021-08-30

## 2021-09-01 ENCOUNTER — OFFICE VISIT (OUTPATIENT)
Dept: ONCOLOGY | Age: 61
End: 2021-09-01
Payer: COMMERCIAL

## 2021-09-01 VITALS
OXYGEN SATURATION: 98 % | TEMPERATURE: 97.8 F | WEIGHT: 234 LBS | HEIGHT: 68 IN | RESPIRATION RATE: 18 BRPM | DIASTOLIC BLOOD PRESSURE: 73 MMHG | BODY MASS INDEX: 35.46 KG/M2 | SYSTOLIC BLOOD PRESSURE: 132 MMHG | HEART RATE: 88 BPM

## 2021-09-01 DIAGNOSIS — C50.912 MALIGNANT NEOPLASM OF LEFT BREAST, STAGE 2 (HCC): Primary | ICD-10-CM

## 2021-09-01 PROCEDURE — 99214 OFFICE O/P EST MOD 30 MIN: CPT | Performed by: INTERNAL MEDICINE

## 2021-09-01 NOTE — PROGRESS NOTES
Formerly [facility], 1926–[address] [employer]  [address] [place] [address], [address] [facility]  Lisa [address]  W: [phone]  F: [phone]       Reason for Visit:   Miya Teran is a 63 y.o. female who is seen for follow up of breast cancer    HPI:   Miya Teran is a 63 y.o.  female who I was asked to see for a history of breast cancer and new LAD. She was originally diagnosed on 12/15/10 with a left breast biopsy showing IDC, gr 3, 0.4 cm, ER negative, OH negative, HER 2 negative (IHC 2 +, negative by FISH per report) ki67 90% . Originally clinical stage 3. Underwent neoadjuvant chemotherapy by Dr. Irwin Stock, unclear regimen, may have been DD [facility] and paclitaxel. S/p with Dr. Alonzo Quintana bilateral mastectomies on 8/10/11 showed benign tissue on the right, and on the left, no residual disease in the breast, but 1/10 LN involved with 0.8 cm of tumor, triple negative. S/p XRT with Dr. Tyler Jean Baptiste on 10/25/11. [id number], stage IIA. BRCA 1/2 negative 1/4/11, MACKENZIE not performed. She was in [facility] ED for chest pain and SOB in 2/2018. Referred to a cardiologist, who ordered the Chest CT below --    4/3/18 CT chest shows right axillary and supraclavicular enlarged LN:  Small bilateral hilar LN, largest in right hilum being 1.5 cm; enlarged right ax LN, 2 cm; 1.6 cm right supraclav LN    4/17/18 PET:  Low grade activity in multiple small LNs throughout each side of neck    7/2020 CT Chest: no evidence for metastatic disease from breast cancer. Grossly stable borderline enlarged AP window node. Interval History:     In today for follow up. She reports no symptoms today. DX   Encounter Diagnosis   Name Primary?     Malignant neoplasm of left breast, stage 2 ([address] Yes        Past Medical History:   Diagnosis Date    Breast cancer ([address] 8/9/2011    Cancer ([address] 12/2010    left breast; LEFT ARM RESTRICTION FOR STICKS & BPs    Carcinoma of female breast ([address] 12/29/2010    Other ill-defined conditions(799.89) Hiatal Hernia    Other ill-defined conditions(799.89)     Migraines    Other ill-defined conditions(799.89)     Heart Murmur (past)    Psychiatric disorder     ANXIETY IF DRIVING ON INTERSTATE    Unspecified adverse effect of anesthesia     Lost Hair after colonoscopy  -  eyelids felt like \"chemical burn\"     Past Surgical History:   Procedure Laterality Date    HX BREAST BIOPSY      x 4  (in office)    HX BREAST RECONSTRUCTION      HX BREAST RECONSTRUCTION  2013    BREAST RECONSTRUCTION performed by Bala Mims MD at [address] HX BREAST RECONSTRUCTION  2014    REVISION RIGHT RECONSTRUCTION BREAST performed by Bala Mims MD at [address] HX MASTECTOMY  2011    BILATERAL MASTECTOMY    HX ORTHOPAEDIC      Left Rotator Cuff Repair x 2     HX ORTHOPAEDIC      Manipulation of left shoulder x 2    HX OTHER SURGICAL      Colonoscopy    HX PELVIC LAPAROSCOPY      HX VASCULAR ACCESS      Portacath insertion/REMOVAL     Social History     Socioeconomic History    Marital status: SINGLE     Spouse name: Not on file    Number of children: Not on file    Years of education: Not on file    Highest education level: Not on file   Tobacco Use    Smoking status: Former Smoker     Packs/day: 0.25     Years: 9.00     Pack years: 2.25     Quit date: 1989     Years since quittin.2    Smokeless tobacco: Never Used    Tobacco comment: SOCIAL SMOKING ONLY   Substance and Sexual Activity    Alcohol use: Yes     Comment: SOCIALLY    Drug use: No    Sexual activity: Never     Social Determinants of Health     Financial Resource Strain:     Difficulty of Paying Living Expenses:    Food Insecurity:     Worried About Running Out of Food in the Last Year:     Ran Out of Food in the Last Year:    Transportation Needs:     Lack of Transportation (Medical):      Lack of Transportation (Non-Medical):    Physical Activity:     Days of Exercise per Week:     Minutes of Exercise per Session:    Stress:     Feeling of Stress :    Social Connections:     Frequency of Communication with Friends and Family:     Frequency of Social Gatherings with Friends and Family:     Attends [employer] Services:     Active Member of Clubs or Organizations:     Attends Club or Organization Meetings:     Marital Status:      Family History   Problem Relation Age of Onset    Hypertension Mother     Alcohol abuse Father     Hypertension Maternal Aunt     Hypertension Maternal Uncle     Hypertension Maternal Grandmother     Cancer Paternal Grandmother         BREAST    Breast Cancer Paternal Grandmother     Hypertension Maternal Aunt     Hypertension Maternal Aunt     Hypertension Maternal Aunt     Hypertension Maternal Aunt     Hypertension Maternal Uncle     Hypertension Maternal Aunt     Hypertension Brother     Anesth Problems Neg Hx        Current Outpatient Medications   Medication Sig Dispense Refill    losartan (COZAAR) 25 mg tablet 50 mg.  11    cholecalciferol (VITAMIN D3) 1,000 unit tablet Take  by mouth daily.  cyanocobalamin (VITAMIN B12) 500 mcg tablet Take 500 mcg by mouth daily.  zinc 50 mg Tab Take 50 mg by mouth daily.  aspirin 81 mg tablet Take 81 mg by mouth daily.  FOLIC ACID PO Take 335 mg by mouth daily. Allergies   Allergen Reactions    Other Food Other (comments)     SLIM RODERICK CAUSES VOMITING    Mushroom Combination No.1 Anaphylaxis     Pt allergic to mushrooms.     Adhesive Tape-Silicones Other (comments)     Skin sloughing    Lidocaine Pain Relief [Lidocaine] Other (comments)    Other Medication Diarrhea and Nausea and Vomiting     All \"Cillins\"    Pcn [Penicillins] Diarrhea and Nausea and Vomiting    Sulfamethoxazole-Trimethoprim Diarrhea and Itching       Review of Systems    A comprehensive review of systems was performed and all systems were negative except for HPI     Objective:  Physical Exam:  Visit Vitals  /73   Pulse 88   Temp 97.8 °F (36.6 °C) (Temporal)   Resp 18   Ht 5' 8\" (1.727 m)   Wt 234 lb (106.1 kg)   SpO2 98%   BMI 35.58 kg/m²       ECOG PS: 0  General: No distress  Eyes: PERRL, anicteric sclerae  HENT: Atraumatic, OP clear  Neck: Supple  Respiratory:  normal respiratory effort  CV:  no peripheral edema  MS: Normal gait and station. Skin: No rashes, ecchymoses, or petechiae. Normal temperature, turgor, and texture. Psych: Alert, oriented, appropriate affect, normal judgment/insight  Breasts:  No masses bilaterally, no ax LAD      Diagnostic Imaging   4/3/18 CT chest shows right axillary and supraclavicular enlarged LN:  Small bilateral hilar LN, largest in right hilum being 1.5 cm; enlarged right ax LN, 2 cm; 1.6 cm right supraclav LN    4/17/18 PET:  Low grade activity in multiple small LNs throughout each side of neck    5/7/18 US Right axilla  IMPRESSION:  1. BI-RADS Assessment Category 4a: Suspicious abnormality - Biopsy should be  performed in the absence of clinical contraindication.     Right axillary lymph nodes are confirmed, the largest measuring 1.9 cm. After  discussion with Dr. Shade Nj, it was decided to proceed with a right axillary lymph  node biopsy. This will be performed later today and reported separately.     The patient has been notified of these results and recommendations. 5/7/18 Right LN biopsy: negative. Results for orders placed during the hospital encounter of 07/08/20    CT CHEST WO CONT    Narrative  INDICATION: History of bilateral mastectomy for left-sided breast cancer. Evaluation of the LAD. Patient reports history of pneumonia in December. COMPARISON: PET CT performed 4/17/2018    CONTRAST: None. TECHNIQUE:  5 mm axial images were obtained through the chest. Coronal and  sagittal reformats were generated.   CT dose reduction was achieved through use  of a standardized protocol tailored for this examination and automatic exposure  control for dose modulation. Adaptive statistical iterative reconstruction (ASIR) was utilized. The absence of intravenous contrast reduces the sensitivity for evaluation of  the mediastinum, pete, vasculature, and upper abdominal organs. FINDINGS:    CHEST WALL: No mass or axillary lymphadenopathy. Greatest amount of scarring is  noted on the left. The largest right axillary lymph nodes measure 1.5 x 0.8 cm  (series 3, image 23 and 1.9 x 0.9 cm (series 3, image 19). THYROID: No nodule. MEDIASTINUM: AP window 2.1 x 1.0 cm node previously measured 1.9 x 1.2 cm  (series 3, image 22). PETE: No mass or lymphadenopathy. THORACIC AORTA: No aneurysm. MAIN PULMONARY ARTERY: Normal in caliber. TRACHEA/BRONCHI: Patent. ESOPHAGUS: No wall thickening or dilatation. HEART: Normal in size. No calcification of the LAD. PLEURA: No effusion or pneumothorax. LUNGS: No nodule, mass, or airspace disease. Minimal pleural micronodular area  along the anterior aspect of the lingula compatible with a postradiation change. Interfissural normal-appearing lymph node with short axis diameter of 4 mm  identified on series 3, image 30). INCIDENTALLY IMAGED UPPER ABDOMEN: No significant abnormality in the  incidentally imaged upper abdomen. BONES: No destructive bone lesion. Impression  IMPRESSION:  No evidence for metastatic disease from breast cancer. Grossly stable borderline  enlarged AP window node. No evidence for significant coronary artery calcification.   No evidence for pneumonia      Lab Results  Lab Results   Component Value Date/Time    WBC 6.3 04/11/2014 03:50 PM    HGB 13.7 04/11/2014 03:50 PM    HCT 39.3 04/11/2014 03:50 PM    PLATELET 206 05/90/0354 03:50 PM    MCV 73.2 (L) 04/11/2014 03:50 PM       Lab Results   Component Value Date/Time    Sodium 141 04/11/2014 03:50 PM    Potassium 3.8 04/11/2014 03:50 PM    Chloride 105 04/11/2014 03:50 PM    CO2 29 04/11/2014 03:50 PM    Anion gap 7 04/11/2014 03:50 PM    Glucose 71 04/11/2014 03:50 PM    BUN 13 04/11/2014 03:50 PM    Creatinine 0.34 (L) 04/11/2014 03:50 PM    BUN/Creatinine ratio 38 (H) 04/11/2014 03:50 PM    GFR est AA >60 04/11/2014 03:50 PM    GFR est non-AA >60 04/11/2014 03:50 PM    Calcium 9.4 04/11/2014 03:50 PM    Alk. phosphatase 93 06/26/2013 10:08 AM    Protein, total 6.8 06/26/2013 10:08 AM    Albumin 4.0 06/26/2013 10:08 AM    A-G Ratio 1.4 06/26/2013 10:08 AM    ALT (SGPT) 16 06/26/2013 10:08 AM       4/2/18 wbc 5.2; hgb 15.7, plt 201    Assessment/Plan:  63 y.o. female with L triple negative breast cancer. PS 0    1. Left Breast cancer stage: IIA    Hormonal therapy: not indicated due to receptor (-) status      S/p neoadjuvant chemotherapy and bilateral mastectomies. Goal of therapy was cure. See #2     2. LAD:  US of right axilla and biospy on 5/7/18 normal. Repeat CT Chest 7/8/2020 showed stable borderline enlarged lymph nodes. She has no pain or swelling. Will repeat CT chest wo contrast.  Personally reviewed 7/2020 scans    If this scan is stable, she will graduate and follow up with her PCP. Low risk of recurrence at this time    3. Emotional well being:  She is coping well with her disease and has excellent support. 4.  HTN:  on losartan and following with pcp    5. Genetic testing: Will update testing since done in 2011; will have kit sent to her home    Discussed flu vaccine and third COVID-19 vaccine. This patient was seen in conjunction with Ingris Birch NP. I personally reviewed the history and all points in the assessment and plan, and performed key points on the exam.   L [place] breast cancer, 10 years out. Feeling well, MILO; will update genetic testing. Will repeat Chest CT to eval borderline LAD, and if stable, she may follow up with PCP    All of the patient's questions were answered today. There are no Patient Instructions on file for this visit.    Follow-up and Dispositions    · Return in about 1 year (around 9/1/2022) for lexie/hillary follow up.          Sean Shabazz MD

## 2021-09-11 ENCOUNTER — HOSPITAL ENCOUNTER (OUTPATIENT)
Dept: CT IMAGING | Age: 61
Discharge: HOME | End: 2021-09-11
Attending: INTERNAL MEDICINE
Payer: COMMERCIAL

## 2021-09-11 DIAGNOSIS — C50.912 MALIGNANT NEOPLASM OF LEFT BREAST, STAGE 2 (HCC): ICD-10-CM

## 2021-09-11 PROCEDURE — 71250 CT THORAX DX C-: CPT

## 2021-09-14 ENCOUNTER — TELEPHONE (OUTPATIENT)
Dept: ONCOLOGY | Age: 61
End: 2021-09-14

## 2021-09-14 NOTE — TELEPHONE ENCOUNTER
9/14/21 1:01 PM: Called patient to discuss CT results. No answer; left voicemail requesting call back. 9/14/21 3:56 PM: Returned call to patient to discuss CT results. No answer; left voicemail requesting call back.      ----- Message from Uli Shafer MD sent at 9/13/2021  2:36 PM EDT -----  Let her know this is now normal

## 2021-09-15 ENCOUNTER — TELEPHONE (OUTPATIENT)
Dept: ONCOLOGY | Age: 61
End: 2021-09-15

## 2021-09-15 NOTE — TELEPHONE ENCOUNTER
09/15/2021 at 8:40 am--This user attempted to call the patient back but she did not answer, so I left a detailed message stating that her Chest CT is now normal and if she has any question's or concerns to please give our office a call back along with our office phone number. ----- Message from Sheila Gant RN sent at 9/14/2021  7:29 PM EDT -----  If she doesn't call back tomorrow, would you mind to call her, please? Thank you!  ----- Message -----  From: Maurice Donis MD  Sent: 9/13/2021   2:36 PM EDT  To:  Sheila Gant RN, Shea Bland LPN    Let her know this is now normal

## 2021-09-17 ENCOUNTER — TELEPHONE (OUTPATIENT)
Dept: ONCOLOGY | Age: 61
End: 2021-09-17

## 2021-09-17 NOTE — TELEPHONE ENCOUNTER
Patient called and stated that she had a scan done and would like to know the results. Please advise.      # [phone]

## 2021-09-17 NOTE — TELEPHONE ENCOUNTER
09/17/2021--This user called and spoke with the patient and let her know (From a previous encounter about the Chest CT results), that her Chest CT is now normal. Patient verbalized understanding and did not have any question's or concerns at that time.

## 2023-04-30 ENCOUNTER — HOSPITAL ENCOUNTER (EMERGENCY)
Age: 63
Discharge: HOME | End: 2023-04-30
Attending: STUDENT IN AN ORGANIZED HEALTH CARE EDUCATION/TRAINING PROGRAM
Payer: COMMERCIAL

## 2023-04-30 VITALS
OXYGEN SATURATION: 98 % | HEIGHT: 67 IN | TEMPERATURE: 97.9 F | SYSTOLIC BLOOD PRESSURE: 148 MMHG | BODY MASS INDEX: 36.88 KG/M2 | RESPIRATION RATE: 20 BRPM | HEART RATE: 70 BPM | WEIGHT: 235 LBS | DIASTOLIC BLOOD PRESSURE: 85 MMHG

## 2023-04-30 DIAGNOSIS — M79.89 LEFT LEG SWELLING: Primary | ICD-10-CM

## 2023-04-30 PROCEDURE — 99283 EMERGENCY DEPT VISIT LOW MDM: CPT

## 2023-04-30 PROCEDURE — 74011250637 HC RX REV CODE- 250/637

## 2023-04-30 RX ADMIN — APIXABAN 10 MG: 5 TABLET, FILM COATED ORAL at 20:59

## 2023-05-01 ENCOUNTER — HOSPITAL ENCOUNTER (OUTPATIENT)
Dept: NON INVASIVE DIAGNOSTICS | Age: 63
Discharge: HOME | End: 2023-05-01
Payer: COMMERCIAL

## 2023-05-01 ENCOUNTER — TRANSCRIBE ORDER (OUTPATIENT)
Dept: REGISTRATION | Age: 63
End: 2023-05-01

## 2023-05-01 ENCOUNTER — HOSPITAL ENCOUNTER (EMERGENCY)
Age: 63
Discharge: HOME | End: 2023-05-01
Attending: EMERGENCY MEDICINE
Payer: COMMERCIAL

## 2023-05-01 VITALS
HEART RATE: 85 BPM | WEIGHT: 235 LBS | DIASTOLIC BLOOD PRESSURE: 75 MMHG | BODY MASS INDEX: 36.88 KG/M2 | SYSTOLIC BLOOD PRESSURE: 142 MMHG | RESPIRATION RATE: 18 BRPM | HEIGHT: 67 IN | TEMPERATURE: 97.7 F | OXYGEN SATURATION: 99 %

## 2023-05-01 DIAGNOSIS — I82.4Y9 ACUTE DEEP VEIN THROMBOSIS (DVT) OF PROXIMAL VEIN OF LOWER EXTREMITY, UNSPECIFIED LATERALITY (HCC): Primary | ICD-10-CM

## 2023-05-01 DIAGNOSIS — M79.609 LIMB PAIN: ICD-10-CM

## 2023-05-01 DIAGNOSIS — M79.609 LIMB PAIN: Primary | ICD-10-CM

## 2023-05-01 PROCEDURE — 99283 EMERGENCY DEPT VISIT LOW MDM: CPT

## 2023-05-01 PROCEDURE — 93971 EXTREMITY STUDY: CPT

## 2023-05-01 RX ORDER — RIVAROXABAN 15 MG-20MG
KIT ORAL
Qty: 1 DOSE PACK | Refills: 0 | Status: SHIPPED | OUTPATIENT
Start: 2023-05-01

## 2023-05-01 NOTE — ED PROVIDER NOTES
[facility] EMERGENCY DEPT  EMERGENCY DEPARTMENT HISTORY AND PHYSICAL EXAM      Date: 4/30/2023  Patient Name: Maribel Gallo  MRN: [id number]  [place]: 1960  Date of evaluation: 4/30/2023  Provider: Ruth Landon PA-C   Note Started: 8:17 PM 4/30/23    HISTORY OF PRESENT ILLNESS     Chief Complaint   Patient presents with    Leg Swelling       History Provided By: Patient    HPI: Maribel Gallo is a 66 y.o. female with history of breast cancer in remission for 10 years, presents for leg swelling. Patient went to [facility] and had basic labs done with no significant abnormalities, they sent her here for doppler ultrasound for possible DVT. Her symptoms began about a week ago with foot swelling, but significantly worsened with pain and lower leg swelling over the last day. Denies any recent travel, prolonged immobilization, hormone use, history of DVT. Denies any fevers, chills, numbness or tingling, loss of motor function, shortness of breath, difficulty breathing, nausea/vomiting, constipation/diarrhea, abdominal pain, rash, night sweats, or chest pain.      PAST MEDICAL HISTORY   Past Medical History:  Past Medical History:   Diagnosis Date    Breast cancer ([address] 8/9/2011    Cancer ([address] 12/2010    left breast; LEFT ARM RESTRICTION FOR STICKS & BPs    Carcinoma of female breast ([address] 12/29/2010    Other ill-defined conditions(799.89)     Hiatal Hernia    Other ill-defined conditions(799.89)     Migraines    Other ill-defined conditions(799.89)     Heart Murmur (past)    Psychiatric disorder     ANXIETY IF DRIVING ON INTERSTATE    Unspecified adverse effect of anesthesia 2000    Lost Hair after colonoscopy  -  eyelids felt like \"chemical burn\"       Past Surgical History:  Past Surgical History:   Procedure Laterality Date    HX BREAST BIOPSY      x 4  (in office)    HX BREAST RECONSTRUCTION  07/21012    HX BREAST RECONSTRUCTION  7/24/2013    BREAST RECONSTRUCTION performed by Lilliana Farmer MD at [facility] AM[facility]UL[facility]    HX BREAST RECONSTRUCTION  2014    REVISION RIGHT RECONSTRUCTION BREAST performed by Marquis Melissa MD at [facility]    HX MASTECTOMY  2011    BILATERAL MASTECTOMY    HX ORTHOPAEDIC      Left Rotator Cuff Repair x 2     HX ORTHOPAEDIC      Manipulation of left shoulder x 2    HX OTHER SURGICAL      Colonoscopy    HX PELVIC LAPAROSCOPY      HX VASCULAR ACCESS      Portacath insertion/REMOVAL       Family History:  Family History   Problem Relation Age of Onset    Hypertension Mother     Alcohol abuse Father     Hypertension Maternal Aunt     Hypertension Maternal Uncle     Hypertension Maternal Grandmother     Cancer Paternal Grandmother         BREAST    Breast Cancer Paternal Grandmother     Hypertension Maternal Aunt     Hypertension Maternal Aunt     Hypertension Maternal Aunt     Hypertension Maternal Aunt     Hypertension Maternal Uncle     Hypertension Maternal Aunt     Hypertension Brother     Anesth Problems Neg Hx        Social History:  Social History     Tobacco Use    Smoking status: Former     Packs/day: 0.25     Years: 9.00     Pack years: 2.25     Types: Cigarettes     Quit date: 1989     Years since quittin.9    Smokeless tobacco: Never    Tobacco comments:     SOCIAL SMOKING ONLY   Substance Use Topics    Alcohol use: Yes     Comment: SOCIALLY    Drug use: No       Allergies: Allergies   Allergen Reactions    Other Food Other (comments)     SLIM RODERICK CAUSES VOMITING    Mushroom Combination No.1 Anaphylaxis     Pt allergic to mushrooms.     Adhesive Tape-Silicones Other (comments)     Skin sloughing    Lidocaine Pain Relief [Lidocaine] Other (comments)    Other Medication Diarrhea and Nausea and Vomiting     All \"Cillins\"    Pcn [Penicillins] Diarrhea and Nausea and Vomiting    Sulfamethoxazole-Trimethoprim Diarrhea and Itching       PCP: Bairon Bella MD    Current Meds:   Discharge Medication List as of 2023  8:53 PM        CONTINUE these medications which have NOT CHANGED    Details   losartan (COZAAR) 25 mg tablet 50 mg., Historical Med, R-11      cholecalciferol (VITAMIN D3) 1,000 unit tablet Take  by mouth daily. , Historical Med      cyanocobalamin (VITAMIN B12) 500 mcg tablet Take 500 mcg by mouth daily. Historical Med, 500 mcg      zinc 50 mg Tab Take 50 mg by mouth daily. Historical Med, 50 mg      aspirin 81 mg tablet Take 81 mg by mouth daily. , Historical Med      FOLIC ACID PO Take 367 mg by mouth daily. Historical Med, 100 mg             PHYSICAL EXAM     ED Triage Vitals [04/30/23 1929]   ED Encounter Vitals Group      BP (!) 165/97      Pulse (Heart Rate) 95      Resp Rate 20      Temp 97.9 °F (36.6 °C)      Temp src       O2 Sat (%) 98 %      Weight 235 lb      Height 5' 7\"      Physical Exam  Vitals and nursing note reviewed. Constitutional:       General: She is not in acute distress. Appearance: Normal appearance. She is not ill-appearing. Cardiovascular:      Rate and Rhythm: Normal rate and regular rhythm. Pulses: Normal pulses. Heart sounds: Normal heart sounds. Pulmonary:      Effort: Pulmonary effort is normal. No respiratory distress. Breath sounds: Normal breath sounds. Musculoskeletal:      Right lower leg: Normal.      Left lower leg: Swelling and tenderness present. 1+ Pitting Edema present. Comments: Significant swelling present to LLE, 1+ pitting edema. Pulses 1+ confirmed with doppler, <2 sec capillary refill, sensation intact, motor function intact    Neurological:      Mental Status: She is alert. SCREENINGS              LAB, EKG AND DIAGNOSTIC RESULTS   Labs:  No results found for this or any previous visit (from the past 12 hour(s)). EKG: Initial EKG interpreted by me.  Not Applicable    Radiologic Studies:  Non-plain film images such as CT, Ultrasound and MRI are read by the radiologist. Plain radiographic images are visualized and preliminarily interpreted by the ED Physician with the following findings: Not Applicable    Interpretation per the Radiologist below, if available at the time of this note:  DUPLEX LOWER EXT VENOUS LEFT    Result Date: 5/1/2023  · Acute thrombus present in the left common femoral vein. · Acute thrombus present in the left femoral vein. · Acute thrombus present in the left popliteal vein. · Acute thrombus present in the left posterior tibial vein. · Acute thrombus present in the left peroneal vein. ED COURSE and DIFFERENTIAL DIAGNOSIS/MDM   CC/HPI/PE Summary, DDx:     Patient presents with lower leg edema and pain. DDx include Venous stasis, Lymphedema, DVT, PAD. On exam, 1+ pitting edema, tenderness and swelling present. 1+ pulses felt with doppler, <2 sec capillary refill. Currently, do not have doppler US. Will treat with dose of eliquis and provide form for outpatient doppler to be performed tomorrow. Records Reviewed (source and summary of external notes): Prior medical records and Nursing notes    Vitals:    Vitals:    04/30/23 1929 04/30/23 2101 04/30/23 2102   BP: (!) 165/97 (!) 148/85 (!) 148/85   Pulse: 95 70 70   Resp: 20     Temp: 97.9 °F (36.6 °C)     SpO2: 98%     Weight: 106.6 kg (235 lb)     Height: 5' 7\" (1.702 m)          ED COURSE       Disposition Considerations (Tests not done, Shared Decision Making, Pt Expectation of Test or Treatment.): Not Applicable    Patient was given the following medications:  Medications   apixaban (ELIQUIS) tablet 10 mg (10 mg Oral Given 4/30/23 2059)       CONSULTS: (Who and What was discussed)  None     Social Determinants affecting Dx or Tx: None    Smoking Cessation: Not Applicable    PROCEDURES   Unless otherwise noted above, none. Procedures      CRITICAL CARE TIME   Patient does not meet Critical Care Time, 0 minutes    FINAL IMPRESSION     1. Left leg swelling          DISPOSITION/PLAN   Discharged    Discharge Note: The patient is stable for discharge home.  The signs, symptoms, diagnosis, and discharge instructions have been discussed, understanding conveyed, and agreed upon. The patient is to follow up as recommended or return to ER should their symptoms worsen. PATIENT REFERRED TO:  Follow-up Information       Follow up With Specialties Details Why Contact Info    Lexie Fuentes MD Internal Medicine Physician   [facility] [id number]  [phone]      [facility] EMERGENCY DEPT Emergency Medicine  If needed after DVT study [address]  [phone]              DISCHARGE MEDICATIONS:  Discharge Medication List as of 4/30/2023  8:53 PM            DISCONTINUED MEDICATIONS:  Discharge Medication List as of 4/30/2023  8:53 PM          I am the Primary Clinician of Record: Kelvin Castro PA-C (electronically signed)    (Please note that parts of this dictation were completed with voice recognition software. Quite often unanticipated grammatical, syntax, homophones, and other interpretive errors are inadvertently transcribed by the computer software. Please disregards these errors.  Please excuse any errors that have escaped final proofreading.)

## 2023-05-01 NOTE — ED TRIAGE NOTES
Here yesterday for lt leg swelling. Today had doppler study and was dx with DVT. Given eliquis yesterday.  Reports this am feels like she may have had a reaction to something, lip feeling abnormal.

## 2023-05-01 NOTE — ED PROVIDER NOTES
[facility] EMERGENCY DEPT  EMERGENCY DEPARTMENT HISTORY AND PHYSICAL EXAM      Date: 5/1/2023  Patient Name: Bertha Gutierrez  MRN: [id number]  [place]: 1960  Date of evaluation: 5/1/2023  Provider: Blair Diaz MD   Note Started: 9:52 AM 5/1/23    HISTORY OF PRESENT ILLNESS     Chief Complaint   Patient presents with    Blood Clot       History Provided By: Patient    HPI: Bertha Gutierrez is a 66 y.o. female presents for positive DVT study. Patient that she was seen last night for the same and had her ultrasound this morning. Patient ports she has noticed left leg swelling over the last month, with worsening pain over the last few days. Patient states she was given 2 doses of Eliquis yesterday. Patient also states she felt like her lip was swollen or abnormal and wanted to be evaluated for allergic reaction. Patient denies difficulty swallowing or breathing, denies wheezing, denies rash or pruritus.     PAST MEDICAL HISTORY   Past Medical History:  Past Medical History:   Diagnosis Date    Breast cancer ([address] 8/9/2011    Cancer ([address] 12/2010    left breast; LEFT ARM RESTRICTION FOR STICKS & BPs    Carcinoma of female breast ([address] 12/29/2010    Other ill-defined conditions(799.89)     Hiatal Hernia    Other ill-defined conditions(799.89)     Migraines    Other ill-defined conditions(799.89)     Heart Murmur (past)    Psychiatric disorder     ANXIETY IF DRIVING ON INTERSTATE    Unspecified adverse effect of anesthesia 2000    Lost Hair after colonoscopy  -  eyelids felt like \"chemical burn\"       Past Surgical History:  Past Surgical History:   Procedure Laterality Date    HX BREAST BIOPSY      x 4  (in office)    HX BREAST RECONSTRUCTION  07/21012    HX BREAST RECONSTRUCTION  7/24/2013    BREAST RECONSTRUCTION performed by Charlie Sutton MD at [place] [address]    HX BREAST RECONSTRUCTION  4/18/2014    REVISION RIGHT RECONSTRUCTION BREAST performed by Charlie Sutton MD at [facility]    HX MASTECTOMY  2011    BILATERAL MASTECTOMY    HX ORTHOPAEDIC      Left Rotator Cuff Repair x 2     HX ORTHOPAEDIC      Manipulation of left shoulder x 2    HX OTHER SURGICAL      Colonoscopy    HX PELVIC LAPAROSCOPY      HX VASCULAR ACCESS      Portacath insertion/REMOVAL       Family History:  Family History   Problem Relation Age of Onset    Hypertension Mother     Alcohol abuse Father     Hypertension Maternal Aunt     Hypertension Maternal Uncle     Hypertension Maternal Grandmother     Cancer Paternal Grandmother         BREAST    Breast Cancer Paternal Grandmother     Hypertension Maternal Aunt     Hypertension Maternal Aunt     Hypertension Maternal Aunt     Hypertension Maternal Aunt     Hypertension Maternal Uncle     Hypertension Maternal Aunt     Hypertension Brother     Anesth Problems Neg Hx        Social History:  Social History     Tobacco Use    Smoking status: Former     Packs/day: 0.25     Years: 9.00     Pack years: 2.25     Types: Cigarettes     Quit date: 1989     Years since quittin.9    Smokeless tobacco: Never    Tobacco comments:     SOCIAL SMOKING ONLY   Substance Use Topics    Alcohol use: Yes     Comment: SOCIALLY    Drug use: No       Allergies: Allergies   Allergen Reactions    Other Food Other (comments)     SLIM RODERICK CAUSES VOMITING    Mushroom Combination No.1 Anaphylaxis     Pt allergic to mushrooms. Adhesive Tape-Silicones Other (comments)     Skin sloughing    Lidocaine Pain Relief [Lidocaine] Other (comments)    Other Medication Diarrhea and Nausea and Vomiting     All \"Cillins\"    Pcn [Penicillins] Diarrhea and Nausea and Vomiting    Sulfamethoxazole-Trimethoprim Diarrhea and Itching       PCP: Emily Hernandez MD    Current Meds:   Previous Medications    ASPIRIN 81 MG TABLET    Take 81 mg by mouth daily. CHOLECALCIFEROL (VITAMIN D3) 1,000 UNIT TABLET    Take  by mouth daily. CYANOCOBALAMIN (VITAMIN B12) 500 MCG TABLET    Take 500 mcg by mouth daily.       FOLIC ACID PO    Take 100 mg by mouth daily. LOSARTAN (COZAAR) 25 MG TABLET    50 mg. ZINC 50 MG TAB    Take 50 mg by mouth daily. PHYSICAL EXAM     ED Triage Vitals [05/01/23 0910]   ED Encounter Vitals Group      BP (!) 142/75      Pulse (Heart Rate) 85      Resp Rate 18      Temp 97.7 °F (36.5 °C)      Temp src       O2 Sat (%) 99 %      Weight 235 lb      Height 5' 7\"      Physical Exam  Physical Exam  Constitutional:       General: No acute distress. Appearance: Normal appearance. Not toxic-appearing. HENT:      Head: Normocephalic and atraumatic. Nose: Nose normal.      Mouth/Throat:      Mouth: Mucous membranes are moist.  Uvula midline without swelling. Tolerating secretions out difficulty. No lip swelling or abnormality noted. Eyes:      Extraocular Movements: Extraocular movements intact. Pupils: Pupils are equal, round, and reactive to light. Cardiovascular:      Rate and Rhythm: Normal rate. Pulses: Normal pulses. Pulmonary:      Effort: Pulmonary effort is normal.      Breath sounds: No stridor, clear to auscultation bilaterally  Abdominal:      General: Abdomen is flat. There is no distension. Musculoskeletal:         General: Normal range of motion. Cervical back: Normal range of motion and neck supple. Skin:     General: Skin is warm and dry. Capillary Refill: Capillary refill takes less than 2 seconds. Neurological:      General: No focal deficit present. Mental Status: Alert and oriented to person, place, and time. Psychiatric:         Mood and Affect: Mood normal.         Behavior: Behavior normal.       SCREENINGS              LAB, EKG AND DIAGNOSTIC RESULTS   Labs:  No results found for this or any previous visit (from the past 12 hour(s)). EKG: Initial EKG interpreted by me.  Not Applicable    Radiologic Studies:  Non-plain film images such as CT, Ultrasound and MRI are read by the radiologist. Plain radiographic images are visualized and preliminarily interpreted by the ED Physician with the following findings: Not Applicable    Interpretation per the Radiologist below, if available at the time of this note:  No results found. ED COURSE and DIFFERENTIAL DIAGNOSIS/MDM   CC/HPI/PE Summary, DDx: Patient with positive DVT study but already started on Eliquis. I do not see any evidence of allergic reaction here, however given her concern we will change to Xarelto for her treatment. Records Reviewed (source and summary of external notes): Prior medical records and Nursing notes    Vitals:    Vitals:    05/01/23 0910   BP: (!) 142/75   Pulse: 85   Resp: 18   Temp: 97.7 °F (36.5 °C)   SpO2: 99%   Weight: 106.6 kg (235 lb)   Height: 5' 7\" (1.702 m)        ED COURSE       Disposition Considerations (Tests not done, Shared Decision Making, Pt Expectation of Test or Treatment.): Not Applicable    Patient was given the following medications:  Medications - No data to display    CONSULTS: (Who and What was discussed)  None     Social Determinants affecting Dx or Tx: None    Smoking Cessation: Not Applicable    PROCEDURES   Unless otherwise noted above, none. Procedures      CRITICAL CARE TIME   Patient does not meet Critical Care Time, 0 minutes    FINAL IMPRESSION     1. Acute deep vein thrombosis (DVT) of proximal vein of lower extremity, unspecified laterality [facility])          DISPOSITION/PLAN   Discharged    Discharge Note: The patient is stable for discharge home. The signs, symptoms, diagnosis, and discharge instructions have been discussed, understanding conveyed, and agreed upon. The patient is to follow up as recommended or return to ER should their symptoms worsen.       PATIENT REFERRED TO:  Follow-up Information       Follow up With Specialties Details Why Contact Info    Dayami Alcaraz MD Internal Medicine Physician  As needed [facility]  [phone]                DISCHARGE MEDICATIONS:  Current Discharge Medication List        START taking these medications    Details   rivaroxaban (Xarelto DVT-PE Treat 30d Start) 15 mg (42)- 20 mg (9) DsPk Take one 15 mg tablet twice a day with food for the first 21 days. Then, take one 20 mg tablet once a day with food for 9 days. Qty: 1 Dose Pack, Refills: 0  Start date: 5/1/2023               DISCONTINUED MEDICATIONS:  Current Discharge Medication List          I am the Primary Clinician of Record: Rani Dickey MD (electronically signed)    (Please note that parts of this dictation were completed with voice recognition software. Quite often unanticipated grammatical, syntax, homophones, and other interpretive errors are inadvertently transcribed by the computer software. Please disregards these errors.  Please excuse any errors that have escaped final proofreading.)

## 2023-05-19 ENCOUNTER — APPOINTMENT (OUTPATIENT)
Facility: HOSPITAL | Age: 63
DRG: 301 | End: 2023-05-19
Payer: COMMERCIAL

## 2023-05-19 ENCOUNTER — HOSPITAL ENCOUNTER (INPATIENT)
Facility: HOSPITAL | Age: 63
LOS: 3 days | Discharge: HOME | DRG: 301 | End: 2023-05-23
Attending: EMERGENCY MEDICINE | Admitting: INTERNAL MEDICINE
Payer: COMMERCIAL

## 2023-05-19 DIAGNOSIS — I82.91 CHRONIC DEEP VEIN THROMBOSIS (DVT) OF NON-EXTREMITY VEIN: Primary | ICD-10-CM

## 2023-05-19 LAB
ALBUMIN SERPL-MCNC: 3.1 G/DL (ref 3.5–5)
ALBUMIN/GLOB SERPL: 0.8 (ref 1.1–2.2)
ALP SERPL-CCNC: 85 U/L (ref 45–117)
ALT SERPL-CCNC: 18 U/L (ref 12–78)
ANION GAP SERPL CALC-SCNC: 6 MMOL/L (ref 5–15)
AST SERPL W P-5'-P-CCNC: 12 U/L (ref 15–37)
BASOPHILS # BLD: 0 K/UL (ref 0–0.1)
BASOPHILS NFR BLD: 0 % (ref 0–1)
BILIRUB SERPL-MCNC: 0.4 MG/DL (ref 0.2–1)
BUN SERPL-MCNC: 10 MG/DL (ref 6–20)
BUN/CREAT SERPL: 20 (ref 12–20)
CA-I BLD-MCNC: 8.6 MG/DL (ref 8.5–10.1)
CHLORIDE SERPL-SCNC: 107 MMOL/L (ref 97–108)
CO2 SERPL-SCNC: 27 MMOL/L (ref 21–32)
CREAT SERPL-MCNC: 0.5 MG/DL (ref 0.55–1.02)
DIFFERENTIAL METHOD BLD: ABNORMAL
ECHO BSA: 2.28 M2
EOSINOPHIL # BLD: 0 K/UL (ref 0–0.4)
EOSINOPHIL NFR BLD: 1 % (ref 0–7)
ERYTHROCYTE [DISTWIDTH] IN BLOOD BY AUTOMATED COUNT: 15.5 % (ref 11.5–14.5)
GLOBULIN SER CALC-MCNC: 4.1 G/DL (ref 2–4)
GLUCOSE SERPL-MCNC: 76 MG/DL (ref 65–100)
HCT VFR BLD AUTO: 35.9 % (ref 35–47)
HGB BLD-MCNC: 12.2 G/DL (ref 11.5–16)
IMM GRANULOCYTES # BLD AUTO: 0 K/UL (ref 0–0.04)
IMM GRANULOCYTES NFR BLD AUTO: 0 % (ref 0–0.5)
LYMPHOCYTES # BLD: 1.7 K/UL (ref 0.8–3.5)
LYMPHOCYTES NFR BLD: 33 % (ref 12–49)
MCH RBC QN AUTO: 25.7 PG (ref 26–34)
MCHC RBC AUTO-ENTMCNC: 34 G/DL (ref 30–36.5)
MCV RBC AUTO: 75.7 FL (ref 80–99)
MONOCYTES # BLD: 0.3 K/UL (ref 0–1)
MONOCYTES NFR BLD: 7 % (ref 5–13)
NEUTS SEG # BLD: 3 K/UL (ref 1.8–8)
NEUTS SEG NFR BLD: 59 % (ref 32–75)
NRBC # BLD: 0 K/UL (ref 0–0.01)
NRBC BLD-RTO: 0 PER 100 WBC
PLATELET # BLD AUTO: 233 K/UL (ref 150–400)
PMV BLD AUTO: 10.1 FL (ref 8.9–12.9)
POTASSIUM SERPL-SCNC: 3.5 MMOL/L (ref 3.5–5.1)
PROT SERPL-MCNC: 7.2 G/DL (ref 6.4–8.2)
RBC # BLD AUTO: 4.74 M/UL (ref 3.8–5.2)
SODIUM SERPL-SCNC: 140 MMOL/L (ref 136–145)
WBC # BLD AUTO: 5 K/UL (ref 3.6–11)

## 2023-05-19 PROCEDURE — 85610 PROTHROMBIN TIME: CPT

## 2023-05-19 PROCEDURE — 80053 COMPREHEN METABOLIC PANEL: CPT

## 2023-05-19 PROCEDURE — 71260 CT THORAX DX C+: CPT

## 2023-05-19 PROCEDURE — 6360000004 HC RX CONTRAST MEDICATION: Performed by: EMERGENCY MEDICINE

## 2023-05-19 PROCEDURE — 85730 THROMBOPLASTIN TIME PARTIAL: CPT

## 2023-05-19 PROCEDURE — 96374 THER/PROPH/DIAG INJ IV PUSH: CPT

## 2023-05-19 PROCEDURE — 36415 COLL VENOUS BLD VENIPUNCTURE: CPT

## 2023-05-19 PROCEDURE — 93971 EXTREMITY STUDY: CPT

## 2023-05-19 PROCEDURE — C1751 CATH, INF, PER/CENT/MIDLINE: HCPCS

## 2023-05-19 PROCEDURE — 99285 EMERGENCY DEPT VISIT HI MDM: CPT

## 2023-05-19 PROCEDURE — 85520 HEPARIN ASSAY: CPT

## 2023-05-19 PROCEDURE — 85025 COMPLETE CBC W/AUTO DIFF WBC: CPT

## 2023-05-19 PROCEDURE — 6360000002 HC RX W HCPCS: Performed by: EMERGENCY MEDICINE

## 2023-05-19 RX ORDER — HEPARIN SODIUM 10000 [USP'U]/100ML
5-30 INJECTION, SOLUTION INTRAVENOUS CONTINUOUS
Status: DISCONTINUED | OUTPATIENT
Start: 2023-05-19 | End: 2023-05-23

## 2023-05-19 RX ORDER — HEPARIN SODIUM 1000 [USP'U]/ML
80 INJECTION, SOLUTION INTRAVENOUS; SUBCUTANEOUS PRN
Status: DISCONTINUED | OUTPATIENT
Start: 2023-05-19 | End: 2023-05-23

## 2023-05-19 RX ORDER — HEPARIN SODIUM 1000 [USP'U]/ML
40 INJECTION, SOLUTION INTRAVENOUS; SUBCUTANEOUS PRN
Status: DISCONTINUED | OUTPATIENT
Start: 2023-05-19 | End: 2023-05-23

## 2023-05-19 RX ORDER — HEPARIN SODIUM 1000 [USP'U]/ML
80 INJECTION, SOLUTION INTRAVENOUS; SUBCUTANEOUS ONCE
Status: COMPLETED | OUTPATIENT
Start: 2023-05-19 | End: 2023-05-19

## 2023-05-19 RX ADMIN — HEPARIN SODIUM 5 UNITS/KG/HR: 10000 INJECTION, SOLUTION INTRAVENOUS at 23:52

## 2023-05-19 RX ADMIN — IOPAMIDOL 100 ML: 755 INJECTION, SOLUTION INTRAVENOUS at 21:47

## 2023-05-19 RX ADMIN — HEPARIN SODIUM 8200 UNITS: 1000 INJECTION INTRAVENOUS; SUBCUTANEOUS at 23:51

## 2023-05-19 ASSESSMENT — PAIN - FUNCTIONAL ASSESSMENT: PAIN_FUNCTIONAL_ASSESSMENT: NONE - DENIES PAIN

## 2023-05-20 PROBLEM — I82.4Y2 DVT, LOWER EXTREMITY, PROXIMAL, ACUTE, LEFT (HCC): Status: ACTIVE | Noted: 2023-05-20

## 2023-05-20 LAB
APTT PPP: 43.3 SEC (ref 21.2–34.1)
APTT PPP: 43.8 SEC (ref 21.2–34.1)
APTT PPP: 50.5 SEC (ref 21.2–34.1)
INR PPP: 2.3 (ref 0.9–1.1)
PROTHROMBIN TIME: 25 SEC (ref 11.9–14.6)
THERAPEUTIC RANGE: ABNORMAL SEC (ref 82–109)
UFH PPP CHRO-ACNC: >1.1 IU/ML

## 2023-05-20 PROCEDURE — 6360000002 HC RX W HCPCS

## 2023-05-20 PROCEDURE — 85730 THROMBOPLASTIN TIME PARTIAL: CPT

## 2023-05-20 PROCEDURE — 6370000000 HC RX 637 (ALT 250 FOR IP): Performed by: INTERNAL MEDICINE

## 2023-05-20 PROCEDURE — 2580000003 HC RX 258: Performed by: INTERNAL MEDICINE

## 2023-05-20 PROCEDURE — 36415 COLL VENOUS BLD VENIPUNCTURE: CPT

## 2023-05-20 PROCEDURE — 6360000002 HC RX W HCPCS: Performed by: INTERNAL MEDICINE

## 2023-05-20 PROCEDURE — 2060000000 HC ICU INTERMEDIATE R&B

## 2023-05-20 RX ORDER — ACETAMINOPHEN 325 MG/1
650 TABLET ORAL EVERY 6 HOURS PRN
Status: DISCONTINUED | OUTPATIENT
Start: 2023-05-20 | End: 2023-05-23 | Stop reason: HOSPADM

## 2023-05-20 RX ORDER — SODIUM CHLORIDE 0.9 % (FLUSH) 0.9 %
5-40 SYRINGE (ML) INJECTION PRN
Status: DISCONTINUED | OUTPATIENT
Start: 2023-05-20 | End: 2023-05-23 | Stop reason: HOSPADM

## 2023-05-20 RX ORDER — HYDROCODONE BITARTRATE AND ACETAMINOPHEN 5; 325 MG/1; MG/1
1 TABLET ORAL EVERY 4 HOURS PRN
Status: DISCONTINUED | OUTPATIENT
Start: 2023-05-20 | End: 2023-05-23 | Stop reason: HOSPADM

## 2023-05-20 RX ORDER — ACETAMINOPHEN 650 MG/1
650 SUPPOSITORY RECTAL EVERY 6 HOURS PRN
Status: DISCONTINUED | OUTPATIENT
Start: 2023-05-20 | End: 2023-05-23 | Stop reason: HOSPADM

## 2023-05-20 RX ORDER — ACETAMINOPHEN 500 MG
500 TABLET ORAL EVERY 6 HOURS PRN
COMMUNITY

## 2023-05-20 RX ORDER — SODIUM CHLORIDE 9 MG/ML
INJECTION, SOLUTION INTRAVENOUS PRN
Status: DISCONTINUED | OUTPATIENT
Start: 2023-05-20 | End: 2023-05-23 | Stop reason: HOSPADM

## 2023-05-20 RX ORDER — DIPHENHYDRAMINE HYDROCHLORIDE, ZINC ACETATE 2; .1 G/100G; G/100G
CREAM TOPICAL 3 TIMES DAILY PRN
Status: DISCONTINUED | OUTPATIENT
Start: 2023-05-20 | End: 2023-05-23 | Stop reason: HOSPADM

## 2023-05-20 RX ORDER — LOSARTAN POTASSIUM 50 MG/1
50 TABLET ORAL DAILY
COMMUNITY
Start: 2023-05-21

## 2023-05-20 RX ORDER — HYDROCODONE BITARTRATE AND ACETAMINOPHEN 10; 325 MG/1; MG/1
1 TABLET ORAL EVERY 4 HOURS PRN
Status: DISCONTINUED | OUTPATIENT
Start: 2023-05-20 | End: 2023-05-23 | Stop reason: HOSPADM

## 2023-05-20 RX ORDER — DIPHENHYDRAMINE HYDROCHLORIDE 50 MG/ML
INJECTION INTRAMUSCULAR; INTRAVENOUS
Status: COMPLETED
Start: 2023-05-20 | End: 2023-05-20

## 2023-05-20 RX ORDER — ONDANSETRON 2 MG/ML
4 INJECTION INTRAMUSCULAR; INTRAVENOUS EVERY 6 HOURS PRN
Status: DISCONTINUED | OUTPATIENT
Start: 2023-05-20 | End: 2023-05-23 | Stop reason: HOSPADM

## 2023-05-20 RX ORDER — POLYETHYLENE GLYCOL 3350 17 G/17G
17 POWDER, FOR SOLUTION ORAL DAILY PRN
Status: DISCONTINUED | OUTPATIENT
Start: 2023-05-20 | End: 2023-05-23 | Stop reason: HOSPADM

## 2023-05-20 RX ORDER — ONDANSETRON 4 MG/1
4 TABLET, ORALLY DISINTEGRATING ORAL EVERY 8 HOURS PRN
Status: DISCONTINUED | OUTPATIENT
Start: 2023-05-20 | End: 2023-05-23 | Stop reason: HOSPADM

## 2023-05-20 RX ORDER — SODIUM CHLORIDE 0.9 % (FLUSH) 0.9 %
5-40 SYRINGE (ML) INJECTION EVERY 12 HOURS SCHEDULED
Status: DISCONTINUED | OUTPATIENT
Start: 2023-05-20 | End: 2023-05-23 | Stop reason: HOSPADM

## 2023-05-20 RX ADMIN — DIPHENHYDRAMINE HYDROCHLORIDE, ZINC ACETATE: 2; .1 CREAM TOPICAL at 21:59

## 2023-05-20 RX ADMIN — HEPARIN SODIUM 4100 UNITS: 1000 INJECTION INTRAVENOUS; SUBCUTANEOUS at 09:42

## 2023-05-20 RX ADMIN — DIPHENHYDRAMINE HYDROCHLORIDE, ZINC ACETATE: 2; .1 CREAM TOPICAL at 10:29

## 2023-05-20 RX ADMIN — HEPARIN SODIUM 9 UNITS/KG/HR: 10000 INJECTION, SOLUTION INTRAVENOUS at 21:47

## 2023-05-20 RX ADMIN — SODIUM CHLORIDE, PRESERVATIVE FREE 10 ML: 5 INJECTION INTRAVENOUS at 21:52

## 2023-05-20 RX ADMIN — DIPHENHYDRAMINE HYDROCHLORIDE 25 MG: 50 INJECTION, SOLUTION INTRAMUSCULAR; INTRAVENOUS at 12:06

## 2023-05-20 RX ADMIN — ACETAMINOPHEN 650 MG: 325 TABLET ORAL at 21:50

## 2023-05-20 RX ADMIN — HEPARIN SODIUM 4100 UNITS: 1000 INJECTION INTRAVENOUS; SUBCUTANEOUS at 21:49

## 2023-05-20 ASSESSMENT — PAIN - FUNCTIONAL ASSESSMENT: PAIN_FUNCTIONAL_ASSESSMENT: ACTIVITIES ARE NOT PREVENTED

## 2023-05-20 ASSESSMENT — PAIN DESCRIPTION - LOCATION: LOCATION: HEAD

## 2023-05-20 ASSESSMENT — PAIN SCALES - GENERAL
PAINLEVEL_OUTOF10: 5
PAINLEVEL_OUTOF10: 0

## 2023-05-20 ASSESSMENT — PAIN DESCRIPTION - DESCRIPTORS: DESCRIPTORS: ACHING

## 2023-05-20 ASSESSMENT — PAIN DESCRIPTION - ORIENTATION: ORIENTATION: OTHER (COMMENT)

## 2023-05-20 ASSESSMENT — PAIN SCALES - WONG BAKER: WONGBAKER_NUMERICALRESPONSE: 0

## 2023-05-21 LAB
ANION GAP SERPL CALC-SCNC: 3 MMOL/L (ref 5–15)
APTT PPP: 66.8 SEC (ref 21.2–34.1)
APTT PPP: 75 SEC (ref 21.2–34.1)
APTT PPP: 92.5 SEC (ref 21.2–34.1)
BASOPHILS # BLD: 0 K/UL (ref 0–0.1)
BASOPHILS NFR BLD: 0 % (ref 0–1)
BUN SERPL-MCNC: 14 MG/DL (ref 6–20)
BUN/CREAT SERPL: 27 (ref 12–20)
CA-I BLD-MCNC: 8.6 MG/DL (ref 8.5–10.1)
CHLORIDE SERPL-SCNC: 108 MMOL/L (ref 97–108)
CO2 SERPL-SCNC: 27 MMOL/L (ref 21–32)
CREAT SERPL-MCNC: 0.52 MG/DL (ref 0.55–1.02)
DIFFERENTIAL METHOD BLD: ABNORMAL
EOSINOPHIL # BLD: 0.1 K/UL (ref 0–0.4)
EOSINOPHIL NFR BLD: 2 % (ref 0–7)
ERYTHROCYTE [DISTWIDTH] IN BLOOD BY AUTOMATED COUNT: 15.8 % (ref 11.5–14.5)
GLUCOSE SERPL-MCNC: 119 MG/DL (ref 65–100)
HCT VFR BLD AUTO: 35.3 % (ref 35–47)
HGB BLD-MCNC: 12 G/DL (ref 11.5–16)
IMM GRANULOCYTES # BLD AUTO: 0 K/UL (ref 0–0.04)
IMM GRANULOCYTES NFR BLD AUTO: 1 % (ref 0–0.5)
LYMPHOCYTES # BLD: 1.4 K/UL (ref 0.8–3.5)
LYMPHOCYTES NFR BLD: 34 % (ref 12–49)
MCH RBC QN AUTO: 25.6 PG (ref 26–34)
MCHC RBC AUTO-ENTMCNC: 34 G/DL (ref 30–36.5)
MCV RBC AUTO: 75.3 FL (ref 80–99)
MONOCYTES # BLD: 0.4 K/UL (ref 0–1)
MONOCYTES NFR BLD: 8 % (ref 5–13)
NEUTS SEG # BLD: 2.3 K/UL (ref 1.8–8)
NEUTS SEG NFR BLD: 55 % (ref 32–75)
NRBC # BLD: 0 K/UL (ref 0–0.01)
NRBC BLD-RTO: 0 PER 100 WBC
PLATELET # BLD AUTO: 225 K/UL (ref 150–400)
PMV BLD AUTO: 10.2 FL (ref 8.9–12.9)
POTASSIUM SERPL-SCNC: 3.9 MMOL/L (ref 3.5–5.1)
RBC # BLD AUTO: 4.69 M/UL (ref 3.8–5.2)
SODIUM SERPL-SCNC: 138 MMOL/L (ref 136–145)
THERAPEUTIC RANGE: ABNORMAL SEC (ref 82–109)
WBC # BLD AUTO: 4.2 K/UL (ref 3.6–11)

## 2023-05-21 PROCEDURE — 2580000003 HC RX 258: Performed by: INTERNAL MEDICINE

## 2023-05-21 PROCEDURE — 80048 BASIC METABOLIC PNL TOTAL CA: CPT

## 2023-05-21 PROCEDURE — 85025 COMPLETE CBC W/AUTO DIFF WBC: CPT

## 2023-05-21 PROCEDURE — 85730 THROMBOPLASTIN TIME PARTIAL: CPT

## 2023-05-21 PROCEDURE — 6360000002 HC RX W HCPCS: Performed by: INTERNAL MEDICINE

## 2023-05-21 PROCEDURE — 2060000000 HC ICU INTERMEDIATE R&B

## 2023-05-21 PROCEDURE — 36415 COLL VENOUS BLD VENIPUNCTURE: CPT

## 2023-05-21 PROCEDURE — 6370000000 HC RX 637 (ALT 250 FOR IP): Performed by: HOSPITALIST

## 2023-05-21 RX ORDER — DIPHENHYDRAMINE HCL 25 MG
25 CAPSULE ORAL EVERY 6 HOURS PRN
Status: DISCONTINUED | OUTPATIENT
Start: 2023-05-21 | End: 2023-05-23 | Stop reason: HOSPADM

## 2023-05-21 RX ADMIN — HEPARIN SODIUM 4100 UNITS: 1000 INJECTION INTRAVENOUS; SUBCUTANEOUS at 07:09

## 2023-05-21 RX ADMIN — HEPARIN SODIUM 9 UNITS/KG/HR: 10000 INJECTION, SOLUTION INTRAVENOUS at 07:00

## 2023-05-21 RX ADMIN — SODIUM CHLORIDE, PRESERVATIVE FREE 10 ML: 5 INJECTION INTRAVENOUS at 12:39

## 2023-05-21 RX ADMIN — DIPHENHYDRAMINE HYDROCHLORIDE 25 MG: 25 CAPSULE ORAL at 21:01

## 2023-05-21 RX ADMIN — HEPARIN SODIUM 4100 UNITS: 1000 INJECTION INTRAVENOUS; SUBCUTANEOUS at 14:30

## 2023-05-21 RX ADMIN — HEPARIN SODIUM 13 UNITS/KG/HR: 10000 INJECTION, SOLUTION INTRAVENOUS at 14:32

## 2023-05-21 RX ADMIN — SODIUM CHLORIDE, PRESERVATIVE FREE 10 ML: 5 INJECTION INTRAVENOUS at 21:01

## 2023-05-22 LAB
APTT PPP: 99.9 SEC (ref 21.2–34.1)
HCYS SERPL-SCNC: 9.2 UMOL/L (ref 3.7–13.9)
THERAPEUTIC RANGE: ABNORMAL SEC (ref 82–109)

## 2023-05-22 PROCEDURE — 6360000002 HC RX W HCPCS: Performed by: INTERNAL MEDICINE

## 2023-05-22 PROCEDURE — 83090 ASSAY OF HOMOCYSTEINE: CPT

## 2023-05-22 PROCEDURE — 2580000003 HC RX 258: Performed by: INTERNAL MEDICINE

## 2023-05-22 PROCEDURE — 85730 THROMBOPLASTIN TIME PARTIAL: CPT

## 2023-05-22 PROCEDURE — 36415 COLL VENOUS BLD VENIPUNCTURE: CPT

## 2023-05-22 PROCEDURE — 2060000000 HC ICU INTERMEDIATE R&B

## 2023-05-22 PROCEDURE — 86300 IMMUNOASSAY TUMOR CA 15-3: CPT

## 2023-05-22 RX ADMIN — SODIUM CHLORIDE, PRESERVATIVE FREE 10 ML: 5 INJECTION INTRAVENOUS at 08:19

## 2023-05-22 RX ADMIN — SODIUM CHLORIDE, PRESERVATIVE FREE 10 ML: 5 INJECTION INTRAVENOUS at 21:34

## 2023-05-22 RX ADMIN — HEPARIN SODIUM 13 UNITS/KG/HR: 10000 INJECTION, SOLUTION INTRAVENOUS at 04:09

## 2023-05-22 ASSESSMENT — PAIN SCALES - GENERAL: PAINLEVEL_OUTOF10: 0

## 2023-05-22 NOTE — CONSULTS
INTERVENTIONAL RADIOLOGY  Consult Note      Patient:  Dhaval Patino  :  1960  Age:  66 y.o. MRN:  [id number]    Today's Date:  2023  Admission Date:  2023  Hospital Day:  2  Consult requested by:  Enid De La O MD    Reason for consult:  Left lower extremity DVT.      CURRENT MEDICATIONS  Current Facility-Administered Medications   Medication Dose Route Frequency Provider Last Rate Last Admin    diphenhydrAMINE (BENADRYL) capsule 25 mg  25 mg Oral Q6H PRN Christal Carrillo MD   25 mg at 23 210    sodium chloride flush 0.9 % injection 5-40 mL  5-40 mL IntraVENous 2 times per day Enid De La O MD   10 mL at 23 0819    sodium chloride flush 0.9 % injection 5-40 mL  5-40 mL IntraVENous PRN Enid De La O MD        0.9 % sodium chloride infusion   IntraVENous PRN Enid De La O MD        ondansetron (ZOFRAN-ODT) disintegrating tablet 4 mg  4 mg Oral Q8H PRN Enid De La O MD        Or    ondansetron (ZOFRAN) injection 4 mg  4 mg IntraVENous Q6H PRN Enid De La O MD        polyethylene glycol (GLYCOLAX) packet 17 g  17 g Oral Daily PRN Enid De La O MD        acetaminophen (TYLENOL) tablet 650 mg  650 mg Oral Q6H PRN Enid De La O MD   650 mg at 23 2150    Or    acetaminophen (TYLENOL) suppository 650 mg  650 mg Rectal Q6H PRN Enid De La O MD        HYDROcodone-acetaminophen (NORCO) 5-325 MG per tablet 1 tablet  1 tablet Oral Q4H PRN Enid De La O MD        HYDROcodone-acetaminophen (NORCO)  MG per tablet 1 tablet  1 tablet Oral Q4H PRN Enid De La O MD        diphenhydrAMINE-zinc acetate 2-0.1 % cream   Topical TID PRN Enid De La O MD   Given at 23 215    heparin (porcine) PF injection 8,200 Units  80 Units/kg IntraVENous PRN Enid De La O MD        heparin (porcine) PF injection 4,100 Units  40 Units/kg IntraVENous PRN Enid De La O MD   4,100 Units at 23 1430    heparin 25,000 units in dextrose 5% 250 mL (premix) infusion  5-30 Units/kg/hr IntraVENous Continuous Nhan No

## 2023-05-23 VITALS
OXYGEN SATURATION: 98 % | DIASTOLIC BLOOD PRESSURE: 87 MMHG | HEIGHT: 68 IN | BODY MASS INDEX: 36.09 KG/M2 | SYSTOLIC BLOOD PRESSURE: 133 MMHG | WEIGHT: 238.1 LBS | HEART RATE: 94 BPM | RESPIRATION RATE: 16 BRPM | TEMPERATURE: 98 F

## 2023-05-23 LAB
APTT PPP: 50.9 SEC (ref 21.2–34.1)
CANCER AG27-29 SERPL-ACNC: 33.9 U/ML (ref 0–38.6)
ERYTHROCYTE [DISTWIDTH] IN BLOOD BY AUTOMATED COUNT: 15.7 % (ref 11.5–14.5)
HCT VFR BLD AUTO: 38.5 % (ref 35–47)
HGB BLD-MCNC: 12.9 G/DL (ref 11.5–16)
MCH RBC QN AUTO: 25.4 PG (ref 26–34)
MCHC RBC AUTO-ENTMCNC: 33.5 G/DL (ref 30–36.5)
MCV RBC AUTO: 75.8 FL (ref 80–99)
NRBC # BLD: 0 K/UL (ref 0–0.01)
NRBC BLD-RTO: 0 PER 100 WBC
PLATELET # BLD AUTO: 233 K/UL (ref 150–400)
PMV BLD AUTO: 10.3 FL (ref 8.9–12.9)
RBC # BLD AUTO: 5.08 M/UL (ref 3.8–5.2)
THERAPEUTIC RANGE: ABNORMAL SEC (ref 82–109)
WBC # BLD AUTO: 5.6 K/UL (ref 3.6–11)

## 2023-05-23 PROCEDURE — 85730 THROMBOPLASTIN TIME PARTIAL: CPT

## 2023-05-23 PROCEDURE — 6370000000 HC RX 637 (ALT 250 FOR IP): Performed by: INTERNAL MEDICINE

## 2023-05-23 PROCEDURE — 85027 COMPLETE CBC AUTOMATED: CPT

## 2023-05-23 PROCEDURE — 36415 COLL VENOUS BLD VENIPUNCTURE: CPT

## 2023-05-23 PROCEDURE — 99222 1ST HOSP IP/OBS MODERATE 55: CPT | Performed by: SURGERY

## 2023-05-23 PROCEDURE — 6360000002 HC RX W HCPCS: Performed by: INTERNAL MEDICINE

## 2023-05-23 PROCEDURE — 2580000003 HC RX 258: Performed by: INTERNAL MEDICINE

## 2023-05-23 RX ADMIN — APIXABAN 10 MG: 5 TABLET, FILM COATED ORAL at 12:05

## 2023-05-23 RX ADMIN — HEPARIN SODIUM 13 UNITS/KG/HR: 10000 INJECTION, SOLUTION INTRAVENOUS at 00:09

## 2023-05-23 RX ADMIN — SODIUM CHLORIDE, PRESERVATIVE FREE 10 ML: 5 INJECTION INTRAVENOUS at 09:35

## 2023-05-23 NOTE — PLAN OF CARE
Problem: Discharge Planning  Goal: Discharge to home or other facility with appropriate resources  Outcome: Completed  Flowsheets (Taken 5/23/2023 0594)  Discharge to home or other facility with appropriate resources: Identify barriers to discharge with patient and caregiver     Problem: ABCDS Injury Assessment  Goal: Absence of physical injury  Outcome: Completed

## 2023-05-23 NOTE — DISCHARGE SUMMARY
Hospitalist Discharge Summary     Patient ID:    David Vincent  [id number]  37 y.o.  1960    Admit date: 5/19/2023    Discharge date : 5/23/2023      Final Diagnoses:   Principal Problem:    DVT, lower extremity, proximal, acute, left ([facility]  Active Problems:    Obesity (BMI 30-39. 9)    Breast cancer, stage 2 ([facility]  Resolved Problems:    * No resolved hospital problems. *      Reason for Hospitalization/Hospital Course:     Admission H&P-5/20/2023  David Vincent is a 66 y.o. female with PMH of breast cancer in remission, left leg DVT on xarelto, recent ankle fracture and other medical problems as below. She presented to the ED with a chief complaint of worsening left leg swelling. She noticed that her left leg swelling is worsening since last night, and became tender more erythematous today. She denies chest pain or shortness of breath, however her colleagues said she appears unwell. She reports has been compliant on Xarelto and still taking the starter pack. In the ED, vital signs stable, not hypoxic or tachycardic. Vascular duplex showed acute nonocclusive DVT in the left external iliac vein, common, proximal, middle and distal femoral vein. The thrombus in left common femoral vein appears to be mobile.     =========================     5/22/2023  Patient is alert and oriented x4. No reported chest pain, shortness of breath, nausea/vomiting, abdominal pain noted. Continues to have left lower leg pressure and mild swelling. Counseled extensively on pending IR consultation for possible intervention with mobile acute DVT. Continuing heparin gtt. and eventual transition to Eliquis. Patient states that she was extremely compliant with her Xarelto which was only started approximately 20 days prior.      5/23/23 discharge home  Discussed with Dr Justin Montano vascular surgery  Transition from heparin drip to apixaban prior to discharge

## 2023-05-23 NOTE — CONSULTS
General surgery history and Physical    Patient: Dipak Chen  MRN: [id number]    YOB: 1960  Age: 66 y.o. Sex: female     Chief Complaint:  Chief Complaint   Patient presents with    Leg Pain       History of Present Illness: Dipak Chen is a 66 y.o. very pleasant woman currently hospitalized with a deep vein thrombosis. Patient now complaining of swelling and some rash at the left calf area. Denies any particular worsening pain. Patient denies any chest pain shortness of breath. Venous duplex ultrasound showed nonocclusive left external iliac vein femoral vein and popliteal vein thrombosis. CT of chest negative for PE. Patient was apparently on Xarelto for about 17 days. Patient currently getting heparin drip.     Social History:  Social Connections: Not on file       Past Medical History:  Past Medical History:   Diagnosis Date    Breast cancer ([facility] 8/9/2011    Cancer ([address] 12/2010    left breast; LEFT ARM RESTRICTION FOR STICKS & BPs    Carcinoma of female breast ([facility] 12/29/2010    Other ill-defined conditions(799.89)     Hiatal Hernia    Other ill-defined conditions(799.89)     Migraines    Other ill-defined conditions(799.89)     Heart Murmur (past)    Psychiatric disorder     ANXIETY IF DRIVING ON INTERSTATE    Unspecified adverse effect of anesthesia 2000    Lost Hair after colonoscopy  -  eyelids felt like \"chemical burn\"     Surgical History:  Past Surgical History:   Procedure Laterality Date    BREAST BIOPSY      x 4  (in office)    BREAST RECONSTRUCTION  07/21012    BREAST RECONSTRUCTION  4/18/2014    REVISION RIGHT RECONSTRUCTION BREAST performed by Stacey Sawyer MD at [address] [place] [address]  7/24/2013    BREAST RECONSTRUCTION performed by Stacey Saywer MD at [facility]      Left Rotator Cuff Repair x 2     ORTHOPEDIC SURGERY      Manipulation of left shoulder x 2    OTHER SURGICAL

## 2023-05-23 NOTE — PROGRESS NOTES
CM reviewed medical record and spoke to patient at the bedside. Patient states she lives at home with her family and still works full time. No plans at this time for any discharge needs. Patient remains on Heparin gtt and awaiting IR consult for possible thrombectomy vs. IVC filter placement. Per IDRs, plan is to discharge patient on Eliquis since Xarelto did not work. Eliquis coupons given to patient.
CM reviewed medical record. IR not planning any intervention. Dr. Justin Montano in to see patient this am and concluded patient could be discharged on Eliquis and follow up in his office in 2 weeks. Work note typed for patient by CM and signed by Dr. Ti Sharif, then given to patient. Transition of Care Plan:    RUR: 10%  Prior Level of Functioning: independent  Disposition: home  If SNF or IPR: Date FOC offered: n/a  Date FOC received: n/a  Accepting facility: n/a  Date authorization started with reference number: n/a  Date authorization received and expires: n/a  Follow up appointments: Dr. Justin Montano in 2 weeks  DME needed: n/a  Transportation at discharge: family  IM/IMM Medicare/ letter given:n/a   Is patient a  and connected with [facility]? If yes, was [employer] transfer form completed and [facility] notified? Caregiver Contact: n/a  Discharge Caregiver contacted prior to discharge? N/a  Care Conference needed?  N/a  Barriers to discharge: no
Sreekanth Nevarez has been hospitalized at [facility] from  5/20/23-5/23/23. Patient should follow the following activity level until see by Dr. Vasile Krueger in 2 weeks-   No heavy lifting  Elevate extremity whenever possible   Patient may drive.       Akua Hollingsworth RN,               Dr. Peggy Gaines
Subjective   Subjective:      HPI . Patient denies bleeding. Left leg swelling and pain slightly better. No new complaints. Anxious about wanting to get a ivc filter. Reviewed IR note, They do not feel IVC filter is needed as she is on therapeutic anticoagulation and non occlusive thrombosis.      Objective     Current Facility-Administered Medications:     diphenhydrAMINE (BENADRYL) capsule 25 mg, 25 mg, Oral, Q6H PRN, James Varghese MD, 25 mg at 05/21/23 2101    sodium chloride flush 0.9 % injection 5-40 mL, 5-40 mL, IntraVENous, 2 times per day, Alyson Whaley MD, 10 mL at 05/22/23 2134    sodium chloride flush 0.9 % injection 5-40 mL, 5-40 mL, IntraVENous, PRN, Alyson Whaley MD    0.9 % sodium chloride infusion, , IntraVENous, PRN, Alyson Whaley MD    ondansetron (ZOFRAN-ODT) disintegrating tablet 4 mg, 4 mg, Oral, Q8H PRN **OR** ondansetron (ZOFRAN) injection 4 mg, 4 mg, IntraVENous, Q6H PRN, Alyson Whaley MD    polyethylene glycol (GLYCOLAX) packet 17 g, 17 g, Oral, Daily PRN, Alyson Whaley MD    acetaminophen (TYLENOL) tablet 650 mg, 650 mg, Oral, Q6H PRN, 650 mg at 05/20/23 2150 **OR** acetaminophen (TYLENOL) suppository 650 mg, 650 mg, Rectal, Q6H PRN, Alyson Whaley MD    HYDROcodone-acetaminophen (NORCO) 5-325 MG per tablet 1 tablet, 1 tablet, Oral, Q4H PRN, Alyson Whaley MD    HYDROcodone-acetaminophen (NORCO)  MG per tablet 1 tablet, 1 tablet, Oral, Q4H PRN, Alyson Whaley MD    diphenhydrAMINE-zinc acetate 2-0.1 % cream, , Topical, TID PRN, Alyson Whaley MD, Given at 05/20/23 2159    heparin (porcine) PF injection 8,200 Units, 80 Units/kg, IntraVENous, PRN, Alyson Whaley MD    heparin (porcine) PF injection 4,100 Units, 40 Units/kg, IntraVENous, PRN, Alyson Whaley MD, 4,100 Units at 05/21/23 1430    heparin 25,000 units in dextrose 5% 250 mL (premix) infusion, 5-30 Units/kg/hr, IntraVENous, Continuous, Alyson Whaley MD, Last Rate: 13.3 mL/hr at 05/22/23 0638, 13 Units/kg/hr at 05/22/23
Subjective   Subjective:      HPI . Patient denies bleeding. Left leg swelling and pain slightly better. No new complaints. Anxious about wanting to get a ivc filter. Reviewed IR note, They do not feel IVC filter is needed as she is on therapeutic anticoagulation and non occlusive thrombosis. Seen by Vascular surgery as well and did not recommend a filtre. Noted plans to discharge her on Eliquis . D/w her on instructions how to take it in detail. Leg pain is resolved. She is able to ambulate. Family at bedside.      Objective     Current Facility-Administered Medications:     apixaban (ELIQUIS) tablet 10 mg, 10 mg, Oral, BID, 10 mg at 05/23/23 1205 **FOLLOWED BY** [START ON 5/30/2023] apixaban (ELIQUIS) tablet 5 mg, 5 mg, Oral, BID, Riaz Staples MD    diphenhydrAMINE (BENADRYL) capsule 25 mg, 25 mg, Oral, Q6H PRN, Louis Armenta MD, 25 mg at 05/21/23 2101    sodium chloride flush 0.9 % injection 5-40 mL, 5-40 mL, IntraVENous, 2 times per day, Ben Mcdonald MD, 10 mL at 05/23/23 0935    sodium chloride flush 0.9 % injection 5-40 mL, 5-40 mL, IntraVENous, PRN, Ben Mcdonald MD    0.9 % sodium chloride infusion, , IntraVENous, PRN, Ben Mcdonald MD    ondansetron (ZOFRAN-ODT) disintegrating tablet 4 mg, 4 mg, Oral, Q8H PRN **OR** ondansetron (ZOFRAN) injection 4 mg, 4 mg, IntraVENous, Q6H PRN, Ben Mcdonald MD    polyethylene glycol (GLYCOLAX) packet 17 g, 17 g, Oral, Daily PRN, Ben Mcdonald MD    acetaminophen (TYLENOL) tablet 650 mg, 650 mg, Oral, Q6H PRN, 650 mg at 05/20/23 2150 **OR** acetaminophen (TYLENOL) suppository 650 mg, 650 mg, Rectal, Q6H PRN, Ben Mcdonald MD    HYDROcodone-acetaminophen (NORCO) 5-325 MG per tablet 1 tablet, 1 tablet, Oral, Q4H PRN, Ben Mcdonald MD    HYDROcodone-acetaminophen (NORCO)  MG per tablet 1 tablet, 1 tablet, Oral, Q4H PRN, Ben Mcdonald MD    diphenhydrAMINE-zinc acetate 2-0.1 % cream, , Topical, TID PRN, Ben Mcdonald MD, Given at 05/20/23
VSS. Patient given discharge instructions. Medications and follow-up appointments reviewed. IV and Telemetry removed. Case management, provider, and primary nurse aware of discharge. Discharge plan of care/case management plan validated with provider discharge order. Patient getting dressed, waiting for ride.
Eliquis. Consult IR to evaluate for the need for IVC filter given mobile thrombus. Telemetry monitoring given mobile thrombus. Scan for pulmonary embolism if hemodynamically unstable. Continue heparin GTT for now. History of breast cancer  Advised to go to her oncology for re-evaluation given new DVT    Full code    Discussion/MDM: Patient with multiple medical comorbidities, each with high likelihood for morbidity and mortality if left untreated. I have reviewed patient's presenting subjective and objective findings, as well as all laboratory studies, imaging studies, and vital signs to date as well as treatment rendered and patient's response to those treatments. In addition, prior medical, surgical and relevant social and family histories were reviewed. I have discussed management plan with patient/family and with nursing staff. Toxic drug monitoring:   IV Heparin gtt. Care Plan discussed with: Patient/Family and Nurse    Total time spent with patient: 35 minutes. With greater than 50% spent in coordination of care and counseling.     Jcarlos Cantor MD

## 2023-06-26 ENCOUNTER — TELEPHONE (OUTPATIENT)
Age: 63
End: 2023-06-26

## 2023-06-28 RX ORDER — LOSARTAN POTASSIUM 50 MG/1
1 TABLET ORAL DAILY
Qty: 30 TABLET | Refills: 11 | COMMUNITY
Start: 2023-02-27 | End: 2023-06-30

## 2023-06-30 ENCOUNTER — OFFICE VISIT (OUTPATIENT)
Age: 63
End: 2023-06-30
Payer: COMMERCIAL

## 2023-06-30 ENCOUNTER — OFFICE VISIT (OUTPATIENT)
Facility: CLINIC | Age: 63
End: 2023-06-30
Payer: COMMERCIAL

## 2023-06-30 VITALS
DIASTOLIC BLOOD PRESSURE: 78 MMHG | HEIGHT: 68 IN | HEART RATE: 90 BPM | OXYGEN SATURATION: 99 % | WEIGHT: 232 LBS | BODY MASS INDEX: 35.16 KG/M2 | SYSTOLIC BLOOD PRESSURE: 132 MMHG | TEMPERATURE: 98.3 F

## 2023-06-30 VITALS
RESPIRATION RATE: 17 BRPM | HEART RATE: 92 BPM | OXYGEN SATURATION: 97 % | DIASTOLIC BLOOD PRESSURE: 77 MMHG | HEIGHT: 67 IN | WEIGHT: 232 LBS | BODY MASS INDEX: 36.41 KG/M2 | SYSTOLIC BLOOD PRESSURE: 125 MMHG

## 2023-06-30 DIAGNOSIS — R73.09 ELEVATED HEMOGLOBIN A1C: ICD-10-CM

## 2023-06-30 DIAGNOSIS — I82.412 ACUTE DEEP VEIN THROMBOSIS (DVT) OF FEMORAL VEIN OF LEFT LOWER EXTREMITY (HCC): Primary | ICD-10-CM

## 2023-06-30 DIAGNOSIS — I82.4Y2 DVT, LOWER EXTREMITY, PROXIMAL, ACUTE, LEFT (HCC): Primary | ICD-10-CM

## 2023-06-30 DIAGNOSIS — I10 ESSENTIAL HYPERTENSION, BENIGN: ICD-10-CM

## 2023-06-30 DIAGNOSIS — Z13.220 SCREENING CHOLESTEROL LEVEL: ICD-10-CM

## 2023-06-30 DIAGNOSIS — I82.412 ACUTE DEEP VEIN THROMBOSIS (DVT) OF FEMORAL VEIN OF LEFT LOWER EXTREMITY (HCC): ICD-10-CM

## 2023-06-30 DIAGNOSIS — E66.9 OBESITY (BMI 30-39.9): ICD-10-CM

## 2023-06-30 DIAGNOSIS — M79.89 LEFT LEG SWELLING: ICD-10-CM

## 2023-06-30 PROCEDURE — 3078F DIAST BP <80 MM HG: CPT | Performed by: INTERNAL MEDICINE

## 2023-06-30 PROCEDURE — 99214 OFFICE O/P EST MOD 30 MIN: CPT | Performed by: INTERNAL MEDICINE

## 2023-06-30 PROCEDURE — 99213 OFFICE O/P EST LOW 20 MIN: CPT | Performed by: SURGERY

## 2023-06-30 PROCEDURE — 3074F SYST BP LT 130 MM HG: CPT | Performed by: INTERNAL MEDICINE

## 2023-06-30 SDOH — ECONOMIC STABILITY: FOOD INSECURITY: WITHIN THE PAST 12 MONTHS, YOU WORRIED THAT YOUR FOOD WOULD RUN OUT BEFORE YOU GOT MONEY TO BUY MORE.: NEVER TRUE

## 2023-06-30 SDOH — ECONOMIC STABILITY: FOOD INSECURITY: WITHIN THE PAST 12 MONTHS, THE FOOD YOU BOUGHT JUST DIDN'T LAST AND YOU DIDN'T HAVE MONEY TO GET MORE.: NEVER TRUE

## 2023-06-30 SDOH — ECONOMIC STABILITY: HOUSING INSECURITY
IN THE LAST 12 MONTHS, WAS THERE A TIME WHEN YOU DID NOT HAVE A STEADY PLACE TO SLEEP OR SLEPT IN A SHELTER (INCLUDING NOW)?: NO

## 2023-06-30 SDOH — ECONOMIC STABILITY: INCOME INSECURITY: HOW HARD IS IT FOR YOU TO PAY FOR THE VERY BASICS LIKE FOOD, HOUSING, MEDICAL CARE, AND HEATING?: NOT HARD AT ALL

## 2023-06-30 ASSESSMENT — PATIENT HEALTH QUESTIONNAIRE - PHQ9
SUM OF ALL RESPONSES TO PHQ QUESTIONS 1-9: 0
SUM OF ALL RESPONSES TO PHQ QUESTIONS 1-9: 0
1. LITTLE INTEREST OR PLEASURE IN DOING THINGS: 0
SUM OF ALL RESPONSES TO PHQ QUESTIONS 1-9: 0
DEPRESSION UNABLE TO ASSESS: PT REFUSES
SUM OF ALL RESPONSES TO PHQ9 QUESTIONS 1 & 2: 0
SUM OF ALL RESPONSES TO PHQ QUESTIONS 1-9: 0
2. FEELING DOWN, DEPRESSED OR HOPELESS: 0

## 2023-07-06 LAB
ALBUMIN SERPL-MCNC: 4.3 G/DL (ref 3.8–4.8)
ALBUMIN/GLOB SERPL: 1.5 {RATIO} (ref 1.2–2.2)
ALP SERPL-CCNC: 92 IU/L (ref 44–121)
ALT SERPL-CCNC: 13 IU/L (ref 0–32)
AST SERPL-CCNC: 14 IU/L (ref 0–40)
BASOPHILS # BLD AUTO: 0 X10E3/UL (ref 0–0.2)
BASOPHILS NFR BLD AUTO: 1 %
BILIRUB SERPL-MCNC: 0.4 MG/DL (ref 0–1.2)
BUN SERPL-MCNC: 13 MG/DL (ref 8–27)
BUN/CREAT SERPL: 22 (ref 12–28)
CALCIUM SERPL-MCNC: 9.5 MG/DL (ref 8.7–10.3)
CHLORIDE SERPL-SCNC: 105 MMOL/L (ref 96–106)
CHOLEST SERPL-MCNC: 151 MG/DL (ref 100–199)
CO2 SERPL-SCNC: 22 MMOL/L (ref 20–29)
CREAT SERPL-MCNC: 0.58 MG/DL (ref 0.57–1)
EGFRCR SERPLBLD CKD-EPI 2021: 103 ML/MIN/1.73
EOSINOPHIL # BLD AUTO: 0.1 X10E3/UL (ref 0–0.4)
EOSINOPHIL NFR BLD AUTO: 1 %
ERYTHROCYTE [DISTWIDTH] IN BLOOD BY AUTOMATED COUNT: 17.1 % (ref 11.7–15.4)
GLOBULIN SER CALC-MCNC: 2.9 G/DL (ref 1.5–4.5)
GLUCOSE SERPL-MCNC: 89 MG/DL (ref 70–99)
HBA1C MFR BLD: 5.8 % (ref 4.8–5.6)
HCT VFR BLD AUTO: 41.8 % (ref 34–46.6)
HDLC SERPL-MCNC: 54 MG/DL
HGB BLD-MCNC: 13.5 G/DL (ref 11.1–15.9)
IMM GRANULOCYTES # BLD AUTO: 0 X10E3/UL (ref 0–0.1)
IMM GRANULOCYTES NFR BLD AUTO: 0 %
LDLC SERPL CALC-MCNC: 82 MG/DL (ref 0–99)
LYMPHOCYTES # BLD AUTO: 1.6 X10E3/UL (ref 0.7–3.1)
LYMPHOCYTES NFR BLD AUTO: 31 %
MCH RBC QN AUTO: 24.9 PG (ref 26.6–33)
MCHC RBC AUTO-ENTMCNC: 32.3 G/DL (ref 31.5–35.7)
MCV RBC AUTO: 77 FL (ref 79–97)
MONOCYTES # BLD AUTO: 0.4 X10E3/UL (ref 0.1–0.9)
MONOCYTES NFR BLD AUTO: 9 %
NEUTROPHILS # BLD AUTO: 2.9 X10E3/UL (ref 1.4–7)
NEUTROPHILS NFR BLD AUTO: 58 %
PLATELET # BLD AUTO: 255 X10E3/UL (ref 150–450)
POTASSIUM SERPL-SCNC: 3.9 MMOL/L (ref 3.5–5.2)
PROT SERPL-MCNC: 7.2 G/DL (ref 6–8.5)
RBC # BLD AUTO: 5.42 X10E6/UL (ref 3.77–5.28)
SODIUM SERPL-SCNC: 142 MMOL/L (ref 134–144)
TRIGL SERPL-MCNC: 80 MG/DL (ref 0–149)
TSH SERPL DL<=0.005 MIU/L-ACNC: 0.81 UIU/ML (ref 0.45–4.5)
VLDLC SERPL CALC-MCNC: 15 MG/DL (ref 5–40)
WBC # BLD AUTO: 5 X10E3/UL (ref 3.4–10.8)

## 2023-07-14 ENCOUNTER — TELEPHONE (OUTPATIENT)
Age: 63
End: 2023-07-14

## 2023-08-09 ENCOUNTER — TELEMEDICINE (OUTPATIENT)
Age: 63
End: 2023-08-09

## 2023-08-09 DIAGNOSIS — I82.412 ACUTE DEEP VEIN THROMBOSIS (DVT) OF FEMORAL VEIN OF LEFT LOWER EXTREMITY (HCC): Primary | ICD-10-CM

## 2023-08-09 DIAGNOSIS — I82.90 VTE (VENOUS THROMBOEMBOLISM): ICD-10-CM

## 2023-08-09 DIAGNOSIS — Z85.3 HISTORY OF BREAST CANCER: ICD-10-CM

## 2023-08-09 NOTE — PROGRESS NOTES
absence of intravenous contrast reduces the sensitivity for evaluation of  the mediastinum, barbara, vasculature, and upper abdominal organs. FINDINGS:    CHEST WALL: No mass or axillary lymphadenopathy. Greatest amount of scarring is  noted on the left. The largest right axillary lymph nodes measure 1.5 x 0.8 cm  (series 3, image 23 and 1.9 x 0.9 cm (series 3, image 19). THYROID: No nodule. MEDIASTINUM: AP window 2.1 x 1.0 cm node previously measured 1.9 x 1.2 cm  (series 3, image 22). BARBARA: No mass or lymphadenopathy. THORACIC AORTA: No aneurysm. MAIN PULMONARY ARTERY: Normal in caliber. TRACHEA/BRONCHI: Patent. ESOPHAGUS: No wall thickening or dilatation. HEART: Normal in size. No calcification of the LAD. PLEURA: No effusion or pneumothorax. LUNGS: No nodule, mass, or airspace disease. Minimal pleural micronodular area  along the anterior aspect of the lingula compatible with a postradiation change. Interfissural normal-appearing lymph node with short axis diameter of 4 mm  identified on series 3, image 30). INCIDENTALLY IMAGED UPPER ABDOMEN: No significant abnormality in the  incidentally imaged upper abdomen. BONES: No destructive bone lesion. Impression  IMPRESSION:  No evidence for metastatic disease from breast cancer. Grossly stable borderline  enlarged AP window node. No evidence for significant coronary artery calcification. No evidence for pneumonia      5/19/23 Ct c/a/p  FINDINGS:   VASCULATURE:   No aortic aneurysm or dissection. No proximal pulmonary embolism is identified. MEDIASTINUM:  Within normal limits; No hilar or mediastinal lymphadenopathy. No esophageal mass. No endotracheal or  endobronchial mass. CORONARY ARTERY CALCIUM: Not visualized    PLEURA/LUNGS[de-identified] Minimal atelectasis. Minimal scattered groundglass opacities. No  nodule or mass. SOFT TISSUE/ AXILLA:  No mass or lymphadenopathy. LIVER: Hepatic steatosis There is no intrahepatic duct dilatation.  There is

## 2023-11-20 ENCOUNTER — OFFICE VISIT (OUTPATIENT)
Age: 63
End: 2023-11-20

## 2023-11-20 VITALS
BODY MASS INDEX: 35.31 KG/M2 | RESPIRATION RATE: 20 BRPM | HEART RATE: 96 BPM | WEIGHT: 233 LBS | OXYGEN SATURATION: 97 % | HEIGHT: 68 IN | DIASTOLIC BLOOD PRESSURE: 74 MMHG | SYSTOLIC BLOOD PRESSURE: 113 MMHG | TEMPERATURE: 97.5 F

## 2023-11-20 DIAGNOSIS — I73.9 PERIPHERAL VASCULAR DISEASE (HCC): Primary | ICD-10-CM

## 2024-01-03 ENCOUNTER — TELEPHONE (OUTPATIENT)
Age: 64
End: 2024-01-03

## 2024-01-03 NOTE — TELEPHONE ENCOUNTER
Patient called again today regarding refill on eliquis.They do not have anything at [facility] in [place]. Please call her [phone]

## 2024-01-03 NOTE — TELEPHONE ENCOUNTER
Provider sent in medication. I spoke with the patient and informed her that the medication was sent into the pharmacy and she should be notified when it is ready for

## 2024-01-09 RX ORDER — APIXABAN 5 MG/1
5 TABLET, FILM COATED ORAL 2 TIMES DAILY
Qty: 60 TABLET | Refills: 0 | Status: SHIPPED | OUTPATIENT
Start: 2024-01-09

## 2024-08-01 ENCOUNTER — TELEPHONE (OUTPATIENT)
Age: 64
End: 2024-08-01

## 2024-08-01 DIAGNOSIS — I82.4Y2 DVT, LOWER EXTREMITY, PROXIMAL, ACUTE, LEFT (HCC): Primary | ICD-10-CM

## 2024-08-01 NOTE — TELEPHONE ENCOUNTER
Tried to call Astrid Montes but voicemail box is not set up.She was supposed to have a duplex done and follow up with .  wanted her to have imaging done to see if he can discontinue the Eliquis. I called and spoke with the pharmacy. Her last refill of Eliquis was on 06/27/2024.

## 2024-10-24 ENCOUNTER — APPOINTMENT (OUTPATIENT)
Facility: HOSPITAL | Age: 64
End: 2024-10-24
Payer: COMMERCIAL

## 2024-10-24 ENCOUNTER — HOSPITAL ENCOUNTER (EMERGENCY)
Facility: HOSPITAL | Age: 64
Discharge: HOME | End: 2024-10-24
Payer: COMMERCIAL

## 2024-10-24 VITALS
SYSTOLIC BLOOD PRESSURE: 138 MMHG | BODY MASS INDEX: 34.1 KG/M2 | RESPIRATION RATE: 18 BRPM | DIASTOLIC BLOOD PRESSURE: 74 MMHG | WEIGHT: 225 LBS | HEIGHT: 68 IN | HEART RATE: 86 BPM | OXYGEN SATURATION: 100 % | TEMPERATURE: 98.3 F

## 2024-10-24 DIAGNOSIS — L03.313 CELLULITIS OF CHEST WALL: Primary | ICD-10-CM

## 2024-10-24 DIAGNOSIS — R59.1 LYMPHADENOPATHY: ICD-10-CM

## 2024-10-24 DIAGNOSIS — R19.7 DIARRHEA, UNSPECIFIED TYPE: ICD-10-CM

## 2024-10-24 LAB
ALBUMIN SERPL-MCNC: 3.2 G/DL (ref 3.5–5)
ALBUMIN/GLOB SERPL: 0.7 (ref 1.1–2.2)
ALP SERPL-CCNC: 89 U/L (ref 45–117)
ALT SERPL-CCNC: 24 U/L (ref 12–78)
ANION GAP SERPL CALC-SCNC: 10 MMOL/L (ref 2–12)
AST SERPL W P-5'-P-CCNC: 20 U/L (ref 15–37)
BASOPHILS # BLD: 0 K/UL (ref 0–0.1)
BASOPHILS NFR BLD: 0 % (ref 0–1)
BILIRUB SERPL-MCNC: 0.7 MG/DL (ref 0.2–1)
BUN SERPL-MCNC: 14 MG/DL (ref 6–20)
BUN/CREAT SERPL: 19 (ref 12–20)
CA-I BLD-MCNC: 9.1 MG/DL (ref 8.5–10.1)
CHLORIDE SERPL-SCNC: 100 MMOL/L (ref 97–108)
CO2 SERPL-SCNC: 28 MMOL/L (ref 21–32)
CREAT SERPL-MCNC: 0.75 MG/DL (ref 0.55–1.02)
DIFFERENTIAL METHOD BLD: ABNORMAL
EOSINOPHIL # BLD: 0 K/UL (ref 0–0.4)
EOSINOPHIL NFR BLD: 0 % (ref 0–7)
ERYTHROCYTE [DISTWIDTH] IN BLOOD BY AUTOMATED COUNT: 15 % (ref 11.5–14.5)
GLOBULIN SER CALC-MCNC: 4.8 G/DL (ref 2–4)
GLUCOSE SERPL-MCNC: 78 MG/DL (ref 65–100)
HCT VFR BLD AUTO: 35.9 % (ref 35–47)
HGB BLD-MCNC: 12.7 G/DL (ref 11.5–16)
IMM GRANULOCYTES # BLD AUTO: 0 K/UL (ref 0–0.04)
IMM GRANULOCYTES NFR BLD AUTO: 0 % (ref 0–0.5)
LIPASE SERPL-CCNC: 21 U/L (ref 13–75)
LYMPHOCYTES # BLD: 1.5 K/UL (ref 0.8–3.5)
LYMPHOCYTES NFR BLD: 13 % (ref 12–49)
MCH RBC QN AUTO: 26.1 PG (ref 26–34)
MCHC RBC AUTO-ENTMCNC: 35.4 G/DL (ref 30–36.5)
MCV RBC AUTO: 73.9 FL (ref 80–99)
MONOCYTES # BLD: 1 K/UL (ref 0–1)
MONOCYTES NFR BLD: 9 % (ref 5–13)
NEUTS SEG # BLD: 8.9 K/UL (ref 1.8–8)
NEUTS SEG NFR BLD: 78 % (ref 32–75)
PLATELET # BLD AUTO: 237 K/UL (ref 150–400)
PMV BLD AUTO: 9.6 FL (ref 8.9–12.9)
POTASSIUM SERPL-SCNC: 3.6 MMOL/L (ref 3.5–5.1)
PROT SERPL-MCNC: 8 G/DL (ref 6.4–8.2)
RBC # BLD AUTO: 4.86 M/UL (ref 3.8–5.2)
SODIUM SERPL-SCNC: 138 MMOL/L (ref 136–145)
WBC # BLD AUTO: 11.5 K/UL (ref 3.6–11)

## 2024-10-24 PROCEDURE — 80053 COMPREHEN METABOLIC PANEL: CPT

## 2024-10-24 PROCEDURE — 99285 EMERGENCY DEPT VISIT HI MDM: CPT

## 2024-10-24 PROCEDURE — 83690 ASSAY OF LIPASE: CPT

## 2024-10-24 PROCEDURE — 85025 COMPLETE CBC W/AUTO DIFF WBC: CPT

## 2024-10-24 PROCEDURE — 2580000003 HC RX 258: Performed by: PHYSICIAN ASSISTANT

## 2024-10-24 PROCEDURE — 6360000002 HC RX W HCPCS: Performed by: PHYSICIAN ASSISTANT

## 2024-10-24 PROCEDURE — 96374 THER/PROPH/DIAG INJ IV PUSH: CPT

## 2024-10-24 PROCEDURE — 6360000004 HC RX CONTRAST MEDICATION: Performed by: PHYSICIAN ASSISTANT

## 2024-10-24 PROCEDURE — 70491 CT SOFT TISSUE NECK W/DYE: CPT

## 2024-10-24 PROCEDURE — 36415 COLL VENOUS BLD VENIPUNCTURE: CPT

## 2024-10-24 RX ORDER — DIPHENOXYLATE HYDROCHLORIDE AND ATROPINE SULFATE 2.5; .025 MG/1; MG/1
1 TABLET ORAL 4 TIMES DAILY PRN
Qty: 40 TABLET | Refills: 0 | Status: SHIPPED | OUTPATIENT
Start: 2024-10-24 | End: 2024-11-03

## 2024-10-24 RX ORDER — IOPAMIDOL 755 MG/ML
100 INJECTION, SOLUTION INTRAVASCULAR
Status: COMPLETED | OUTPATIENT
Start: 2024-10-24 | End: 2024-10-24

## 2024-10-24 RX ORDER — DOXYCYCLINE 100 MG/1
100 CAPSULE ORAL 2 TIMES DAILY
Qty: 14 CAPSULE | Refills: 0 | Status: SHIPPED | OUTPATIENT
Start: 2024-10-24 | End: 2024-10-31

## 2024-10-24 RX ADMIN — CEFAZOLIN 2000 MG: 1 INJECTION, POWDER, FOR SOLUTION INTRAMUSCULAR; INTRAVENOUS at 18:01

## 2024-10-24 RX ADMIN — IOPAMIDOL 100 ML: 755 INJECTION, SOLUTION INTRAVENOUS at 15:08

## 2024-10-24 ASSESSMENT — PAIN DESCRIPTION - FREQUENCY: FREQUENCY: CONTINUOUS

## 2024-10-24 ASSESSMENT — PAIN DESCRIPTION - DESCRIPTORS: DESCRIPTORS: ACHING;SORE

## 2024-10-24 ASSESSMENT — PAIN DESCRIPTION - ONSET: ONSET: GRADUAL

## 2024-10-24 ASSESSMENT — PAIN SCALES - GENERAL: PAINLEVEL_OUTOF10: 3

## 2024-10-24 ASSESSMENT — PAIN DESCRIPTION - PAIN TYPE: TYPE: ACUTE PAIN

## 2024-10-24 ASSESSMENT — PAIN - FUNCTIONAL ASSESSMENT
PAIN_FUNCTIONAL_ASSESSMENT: 0-10
PAIN_FUNCTIONAL_ASSESSMENT: ACTIVITIES ARE NOT PREVENTED

## 2024-10-24 ASSESSMENT — PAIN DESCRIPTION - ORIENTATION: ORIENTATION: RIGHT;LOWER

## 2024-10-24 ASSESSMENT — PAIN DESCRIPTION - LOCATION: LOCATION: NECK

## 2024-10-24 NOTE — ED PROVIDER NOTES
[facility] EMERGENCY DEPT  EMERGENCY DEPARTMENT HISTORY AND PHYSICAL EXAM      Date: 10/24/2024  Patient Name: Astrid Montes  MRN: [id number]  YOB: 1960  Date of evaluation: 10/24/2024  Provider: Daktoa Razo PA-C   Note Started: 2:31 PM EDT 10/24/24    HISTORY OF PRESENT ILLNESS     Chief Complaint   Patient presents with    Neck Swelling    Diarrhea       History Provided By: Patient    HPI: Astrid Montes is a 64 y.o. female with past med history as below reviewed by me with the patient to include previous history of breast cancer treated with mastectomy presents to the ED with a complaint of right sided neck/upper chest swelling since earlier today.  Patient states that she was at work when she noticed the swelling followed by pain.  Denies any fevers, chills, nausea, vomiting.  Denies any trauma to the area as well.  Area is painful when the neck is removed and when area is touched as well.  Patient also complaining of diarrhea for the last several months.  States she has had diarrhea since she started Eliquis.  Has not yet discussed this with her primary care manager.    PAST MEDICAL HISTORY   Past Medical History:  Past Medical History:   Diagnosis Date    Breast cancer (HCC) 8/9/2011    Cancer (HCC) 12/2010    left breast; LEFT ARM RESTRICTION FOR STICKS & BPs    Carcinoma of female breast (HCC) 12/29/2010    Other ill-defined conditions(799.89)     Hiatal Hernia    Other ill-defined conditions(799.89)     Migraines    Other ill-defined conditions(799.89)     Heart Murmur (past)    Psychiatric disorder     ANXIETY IF DRIVING ON INTERSTATE    Unspecified adverse effect of anesthesia 2000    Lost Hair after colonoscopy  -  eyelids felt like \"chemical burn\"       Past Surgical History:  Past Surgical History:   Procedure Laterality Date    BREAST BIOPSY      x 4  (in office)    BREAST RECONSTRUCTION  07/21012    BREAST RECONSTRUCTION  4/18/2014    REVISION RIGHT RECONSTRUCTION BREAST performed by Grady ALVAREZ

## 2024-10-24 NOTE — ED TRIAGE NOTES
Pt with c/o \"lump\" to right lower neck that began at 11am and getting worse. Tender to the touch. Also with c/o diarrhea intermittent since March.

## 2025-01-09 ENCOUNTER — TELEPHONE (OUTPATIENT)
Facility: CLINIC | Age: 65
End: 2025-01-09

## 2025-01-09 NOTE — TELEPHONE ENCOUNTER
----- Message from Dariela MORA sent at 1/9/2025  3:12 PM EST -----  Regarding: [facility] Appointment Request  [facility] Appointment Request    Patient needs appointment for [facility] Appointment Type: Annual Visit.    Patient Requested Dates(s): January 20, 2025  Patient Requested Time: latest appointment of the day  Provider Name:Mikey Costello MD      Reason for Appointment Request: Established Patient - No appointments available during search. Patient received message from the practice to make an appointment for a Physical. She wanted tomake the appointment any latest appointment in a day on January 20, 2025 or during the Holiday for President Day next month.  --------------------------------------------------------------------------------------------------------------------------    Relationship to Patient: Self     Call Back Information: OK to leave message on [employer]  Preferred Call Back Number: Phone [phone]

## 2025-01-20 ENCOUNTER — OFFICE VISIT (OUTPATIENT)
Facility: CLINIC | Age: 65
End: 2025-01-20
Payer: COMMERCIAL

## 2025-01-20 VITALS
HEIGHT: 68 IN | HEART RATE: 71 BPM | OXYGEN SATURATION: 96 % | DIASTOLIC BLOOD PRESSURE: 70 MMHG | SYSTOLIC BLOOD PRESSURE: 130 MMHG | BODY MASS INDEX: 35.31 KG/M2 | WEIGHT: 233 LBS

## 2025-01-20 DIAGNOSIS — R73.09 ELEVATED HEMOGLOBIN A1C: ICD-10-CM

## 2025-01-20 DIAGNOSIS — Z12.11 COLON CANCER SCREENING: ICD-10-CM

## 2025-01-20 DIAGNOSIS — Z11.59 NEED FOR HEPATITIS C SCREENING TEST: ICD-10-CM

## 2025-01-20 DIAGNOSIS — Z00.01 ENCOUNTER FOR ANNUAL GENERAL MEDICAL EXAMINATION WITH ABNORMAL FINDINGS IN ADULT: ICD-10-CM

## 2025-01-20 DIAGNOSIS — R19.7 DIARRHEA, UNSPECIFIED TYPE: ICD-10-CM

## 2025-01-20 DIAGNOSIS — Z00.01 ENCOUNTER FOR ANNUAL GENERAL MEDICAL EXAMINATION WITH ABNORMAL FINDINGS IN ADULT: Primary | ICD-10-CM

## 2025-01-20 DIAGNOSIS — Z87.2 HISTORY OF CELLULITIS: ICD-10-CM

## 2025-01-20 DIAGNOSIS — R93.89 ABNORMAL CT SCAN, NECK: ICD-10-CM

## 2025-01-20 PROCEDURE — 99396 PREV VISIT EST AGE 40-64: CPT | Performed by: INTERNAL MEDICINE

## 2025-01-20 RX ORDER — DIPHENOXYLATE HYDROCHLORIDE AND ATROPINE SULFATE 2.5; .025 MG/1; MG/1
1 TABLET ORAL DAILY PRN
Qty: 10 TABLET | Refills: 0 | Status: SHIPPED | OUTPATIENT
Start: 2025-01-20 | End: 2025-01-30

## 2025-01-20 SDOH — ECONOMIC STABILITY: FOOD INSECURITY: WITHIN THE PAST 12 MONTHS, YOU WORRIED THAT YOUR FOOD WOULD RUN OUT BEFORE YOU GOT MONEY TO BUY MORE.: NEVER TRUE

## 2025-01-20 SDOH — ECONOMIC STABILITY: FOOD INSECURITY: WITHIN THE PAST 12 MONTHS, THE FOOD YOU BOUGHT JUST DIDN'T LAST AND YOU DIDN'T HAVE MONEY TO GET MORE.: NEVER TRUE

## 2025-01-20 ASSESSMENT — PATIENT HEALTH QUESTIONNAIRE - PHQ9
SUM OF ALL RESPONSES TO PHQ QUESTIONS 1-9: 0
1. LITTLE INTEREST OR PLEASURE IN DOING THINGS: NOT AT ALL
SUM OF ALL RESPONSES TO PHQ QUESTIONS 1-9: 0
2. FEELING DOWN, DEPRESSED OR HOPELESS: NOT AT ALL
SUM OF ALL RESPONSES TO PHQ9 QUESTIONS 1 & 2: 0

## 2025-01-20 ASSESSMENT — ENCOUNTER SYMPTOMS
VOMITING: 0
NAUSEA: 0
SHORTNESS OF BREATH: 0
COUGH: 0
ABDOMINAL PAIN: 0
DIARRHEA: 0

## 2025-01-20 ASSESSMENT — ANXIETY QUESTIONNAIRES
4. TROUBLE RELAXING: NOT AT ALL
GAD7 TOTAL SCORE: 0
2. NOT BEING ABLE TO STOP OR CONTROL WORRYING: NOT AT ALL
1. FEELING NERVOUS, ANXIOUS, OR ON EDGE: NOT AT ALL
7. FEELING AFRAID AS IF SOMETHING AWFUL MIGHT HAPPEN: NOT AT ALL
6. BECOMING EASILY ANNOYED OR IRRITABLE: NOT AT ALL
3. WORRYING TOO MUCH ABOUT DIFFERENT THINGS: NOT AT ALL
5. BEING SO RESTLESS THAT IT IS HARD TO SIT STILL: NOT AT ALL
IF YOU CHECKED OFF ANY PROBLEMS ON THIS QUESTIONNAIRE, HOW DIFFICULT HAVE THESE PROBLEMS MADE IT FOR YOU TO DO YOUR WORK, TAKE CARE OF THINGS AT HOME, OR GET ALONG WITH OTHER PEOPLE: NOT DIFFICULT AT ALL

## 2025-01-20 NOTE — PROGRESS NOTES
[facility]  [address], [place] [address]  Phone: [phone]      Astrid Montes (: 1960) is a 64 y.o. female, established patient, here for evaluation of the following chief complaint(s):  Annual Exam     SUBJECTIVE/OBJECTIVE:  History of Present Illness  The patient is a 64-year-old female with a past medical history of hypertension, elevated A1c, history of left breast cancer, status post bilateral mastectomy and DVT of the left lower extremity, presenting today for her annual physical. Her last office visit was on 2023.    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 She has previously received the shingles vaccine from [facility] and has completed 4 doses of the COVID-19 vaccine. She is under the care of a gynecologist at [facility]. She has undergone a bilateral mastectomy due to a history of left breast cancer in  and has been informed that mammograms are not necessary post-procedure. She has not yet undergone a colonoscopy but plans to do so. She reports no constipation or presence of black or bloody stools. She has received a tetanus vaccine in  following a minor cut.    She experienced a neck lump in October, which was initially suspected to be an allergic reaction. She sought emergency care where blood work was conducted. She also reported diarrhea at that time. She was prescribed medication for her symptoms, which she has since run out of. She was advised to consult with an oncologist but was unable to do so due to personal circumstances. The neck swelling has since resolved.      Patient stated she gets her diarrhea or soft stool at times and would like to have a refill for the diarrhea medicine which she received from the emergency room.      She is currently on losartan 50 mg for hypertension management.    She

## 2025-01-20 NOTE — PROGRESS NOTES
.  Chief Complaint   Patient presents with    Annual Exam   .  \"Have you been to the ER, urgent care clinic since your last visit?  Hospitalized since your last visit?\"    YES - When: approximately 3 months ago.  Where and Why: [facility] for neck swelling.    “Have you seen or consulted any other health care providers outside our system since your last visit?”    NO     “Have you had a pap smear?”    NO    Date of last Cervical Cancer screen (HPV or PAP): 8/20/2020       “Have you had a colorectal cancer screening such as a colonoscopy/FIT/Cologuard?    NO    No colonoscopy on file  No cologuard on file  No FIT/FOBT on file   No flexible sigmoidoscopy on file     ./70 (Site: Right Upper Arm, Position: Sitting, Cuff Size: Large Adult)   Pulse 71   Ht 1.727 m (5' 8\")   Wt 105.7 kg (233 lb)   SpO2 96%   BMI 35.43 kg/m²

## 2025-01-21 LAB
GLIADIN PEPTIDE IGA SER-ACNC: 5 UNITS (ref 0–19)
IGA SERPL-MCNC: 260 MG/DL (ref 87–352)
TTG IGA SER-ACNC: <2 U/ML (ref 0–3)

## 2025-04-07 DIAGNOSIS — R19.7 DIARRHEA, UNSPECIFIED TYPE: Primary | ICD-10-CM

## 2025-04-07 RX ORDER — DIPHENOXYLATE HYDROCHLORIDE AND ATROPINE SULFATE 2.5; .025 MG/1; MG/1
1 TABLET ORAL DAILY PRN
Qty: 10 TABLET | Refills: 0 | Status: SHIPPED | OUTPATIENT
Start: 2025-04-07 | End: 2025-04-17

## 2025-04-07 RX ORDER — DIPHENOXYLATE HYDROCHLORIDE AND ATROPINE SULFATE 2.5; .025 MG/1; MG/1
1 TABLET ORAL 4 TIMES DAILY PRN
COMMUNITY
End: 2025-04-07 | Stop reason: SDUPTHER

## 2025-04-07 NOTE — TELEPHONE ENCOUNTER
PCP: Mikey Costello MD    Last appt: [unfilled]  No future appointments.    Requested Prescriptions     Pending Prescriptions Disp Refills    diphenoxylate-atropine (LOMOTIL) 2.5-0.025 MG per tablet       Sig: Take 1 tablet by mouth daily. Max Daily Amount: 1 tablet       Prior labs and Blood pressures:  BP Readings from Last 3 Encounters:   01/20/25 130/70   10/24/24 138/74   11/20/23 113/74     Lab Results   Component Value Date/Time     10/24/2024 01:15 PM    K 3.6 10/24/2024 01:15 PM     10/24/2024 01:15 PM    CO2 28 10/24/2024 01:15 PM    BUN 14 10/24/2024 01:15 PM     No results found for: \"HBA1C\", \"[id number]\"  Lab Results   Component Value Date/Time    CHOL 151 07/05/2023 02:18 PM    HDL 54 07/05/2023 02:18 PM    LDL 82 07/05/2023 02:18 PM    VLDL 15 07/05/2023 02:18 PM     No results found for: \"VITD3\"    Lab Results   Component Value Date/Time    TSH 0.811 07/05/2023 02:18 PM